# Patient Record
Sex: FEMALE | Race: BLACK OR AFRICAN AMERICAN | Employment: FULL TIME | URBAN - METROPOLITAN AREA
[De-identification: names, ages, dates, MRNs, and addresses within clinical notes are randomized per-mention and may not be internally consistent; named-entity substitution may affect disease eponyms.]

---

## 2017-01-23 DIAGNOSIS — N95.1 MENOPAUSAL SYMPTOM: Primary | ICD-10-CM

## 2017-01-24 NOTE — TELEPHONE ENCOUNTER
Patient requesting refill of estrogen supplement. Called and advised to make an appointment with me or her gynecologist to discuss plan regarding duration of therapy at this point. She agreed, would like refill x1 to allow for time to make this appointment.    Eduarad Ballard MD

## 2017-02-03 ENCOUNTER — TELEPHONE (OUTPATIENT)
Dept: SURGERY | Age: 62
End: 2017-02-03

## 2017-02-03 NOTE — TELEPHONE ENCOUNTER
The patient called requesting information on her visit for Monday and if she needed a cyst aspiration would it be done at the same time? I assured the patient depending on the findings, Dr. Faina Dejesus would discuss her options with her and it would ultimately be her decision if she wanted any procedures done. The patient was appreciative.

## 2017-02-06 ENCOUNTER — OFFICE VISIT (OUTPATIENT)
Dept: SURGERY | Age: 62
End: 2017-02-06

## 2017-02-06 VITALS
BODY MASS INDEX: 26.4 KG/M2 | DIASTOLIC BLOOD PRESSURE: 63 MMHG | HEIGHT: 63 IN | HEART RATE: 72 BPM | SYSTOLIC BLOOD PRESSURE: 131 MMHG | WEIGHT: 149 LBS

## 2017-02-06 DIAGNOSIS — N60.01 BREAST CYST, RIGHT: Primary | ICD-10-CM

## 2017-02-06 NOTE — COMMUNICATION BODY
HISTORY OF PRESENT ILLNESS  Will Avila is a 58 y.o. female. HPI  ESTABLISHED patient here for RIGHT breast lump. Patient was doing a self breast exam with in the last month and felt a lump to her outer RIGHT breast. She at times feels a quick shooting pain to the outer RIGHT breast. At times her nipples are tender. Denies rashes and skin or nipple changes.      History of biopsy in 2014 to the RIGHT breast which showed a fibroadenoma.     Mammogram and ultrasound 7/2016, BIRADS 2    Past Medical History   Diagnosis Date    Acne 6/14/2011    Allergic rhinitis 6/14/2011    Anemia 6/14/2011    Cancer ([facility] [address]      squamous cell, left ear    Degenerative Cervical Spondylosis w/ B Osteophyte Encroachment 12/19/2011    Migraines 6/14/2011    Recurrent Vaginal Candidiasis 12/19/2011    Right carpal tunnel syndrome 6/14/2011    Vasovagal syncope 6/14/2011    Vitamin D deficiency 6/14/2011       Past Surgical History   Procedure Laterality Date    Hx hemorrhoidectomy      Hx tubal ligation  12/96    Hx scar and bso  1/97     uterine fibroids, hrt Jeanette Neighbor    Pr repair  anal fistula,rect adv flap       external anal fistula repair with lateral sphincterotomy    Colonoscopy  7/2007     Normal, f/u 5 yrs; Dr Brennen Guerra Pr biopsy of skin lesion  1/2010     SCC of left EAC    Ekg orderable  [id number]     NSR, 1st degree AV Block, rates 60, 61, 61    Ekg orderable  2008     Sinus Bradycardia, rate 57, 1st degree AV block    Emg two extremities upper  1/2007, Dr Doyle Stapleton     mild median nerve neuropathy    Xr upper gi series w kub  8/2004     normal UGI and normal CXR    Hx tonsillectomy  age 16    Hx heent       eye surgery    Hx breast biopsy Right 6/5275, Dr Faina Dejesus     needle aspiration 2 benign cysts right breast; Fibroadenoma    Hx colonoscopy  9/2012, Dr Hang Calles     Normal, f/u 5 yrs d/t FHx       Social History     Social History    Marital status:      Spouse name: N/A    Number of children: 0    Years of education: N/A     Occupational History    Adm Asst, for [employer]      Unknown     Social History Main Topics    Smoking status: Never Smoker    Smokeless tobacco: Never Used    Alcohol use Yes      Comment: very occasional glass of wine ~ 1 x a month at most    Drug use: No    Sexual activity: Yes     Partners: Male     Birth control/ protection: None     Other Topics Concern    Caffeine Concern No     no coffee, 1 serving of diet green tea a day, no sodas    Weight Concern No     personal goal is in the 140's    Special Diet Yes     low cholesterol, pretty well balanced    Exercise Yes     4-5 x a week: walks on treadmill x 20 minutes, antoinette rider/stretches x 200 reps a day     Social History Narrative       Current Outpatient Prescriptions on File Prior to Visit   Medication Sig Dispense Refill    Estradiol (EVAMIST) 1.53 mg/spray (1.7%) spry 1 Spray by Apply Externally route daily. 3 Bottle 0    omeprazole (PRILOSEC) 40 mg capsule       trimethoprim-sulfamethoxazole (BACTRIM DS, SEPTRA DS) 160-800 mg per tablet Take 1 Tab by mouth two (2) times a day.  fluticasone (FLONASE) 50 mcg/actuation nasal spray 2 Sprays by Both Nostrils route daily. 3 Bottle 3    butalbital-acetaminophen-caffeine (FIORICET, ESGIC) -40 mg per tablet Take 1 Tab by mouth every four (4) hours as needed for Pain. Max Daily Amount: 6 Tabs. Indications: TENSION-TYPE HEADACHE 24 Tab 0    atorvastatin (LIPITOR) 20 mg tablet Take 1 Tab by mouth daily. 90 Tab 3    cholecalciferol, vitamin D3, (VITAMIN D3) 2,000 unit tab Take 5,000 Units by mouth. Takes a few x a week      MULTIVITS W-CA,FE,OTHER MIN (WOMEN'S DAILY MULTIVITAMIN PO) Take  by mouth. No current facility-administered medications on file prior to visit.         Allergies   Allergen Reactions    Codeine Other (comments)     Confusion, disoriented      Effexor [Venlafaxine] Other (comments)     Chest tightness, nausea       OB History      Para Term  AB TAB SAB Ectopic Multiple Living    0 0 0 0 0 0 0 0 0 0        Obstetric Comments    Previous Gyn Dr Mikki Loza   Menarche:  13. LMP: 38.  # of Children:  0. Age at Delivery of First Child:  n/a. Hysterectomy/oophorectomy:  YES/YES. Breast Bx:  no.  Hx of Breast Feeding:  no. BCP:  yes. Hormone therapy:  yes. ROS  Constitutional: Negative    HENT: Negative. Eyes: Negative. Respiratory: Negative. Cardiovascular: Negative. Gastrointestinal: Negative. Genitourinary: Negative. Musculoskeletal: Negative. Skin: Negative. Neurological: Negative. Endo/Heme/Allergies: Negative. Psychiatric/Behavioral: Negative. Physical Exam   Cardiovascular: Normal rate and normal heart sounds. Pulmonary/Chest: Breath sounds normal. Right breast exhibits mass (breast cyst). Right breast exhibits no inverted nipple, no nipple discharge, no skin change and no tenderness. Left breast exhibits no inverted nipple, no mass, no nipple discharge, no skin change and no tenderness. Breasts are symmetrical.       Lymphadenopathy:        Right cervical: No superficial cervical, no deep cervical and no posterior cervical adenopathy present. Left cervical: No superficial cervical, no deep cervical and no posterior cervical adenopathy present. Right axillary: No pectoral and no lateral adenopathy present. Left axillary: No pectoral and no lateral adenopathy present. BREAST ULTRASOUND  Indication: right breast mass 9:00  Technique: The area was scanned using a high-frequency linear-array near-field transducer  Findings: 2.0 cm mass, oval, anechoic, through transmission  Impression: Benign simple cyst  Disposition: Discussed aspiration or observation. Pt has elected for aspiration    Aspiration of Seroma/Hematoma  Indication : Seroma:  right 9 o'clock  Prep : We cleaned the skin with alcohol. Anesthesia :  We anesthetized with 1.5 cc Xylocaine. Guidance : We used Ultrasound for guidance. Aspiration : We advanced an 18 gauge needle into the fluid collection. Yield : We aspirated 4 cc of fluid. Effect : This decompressed the fluid collection and relieved discomfort    ASSESSMENT and PLAN    ICD-10-CM ICD-9-CM    1. Breast cyst, right N60.01 610.0      Pt presents with symptomatic RIGHT breast cyst, which we aspirated today per pt's request. Pt tolerated procedure well. F/u prn. This plan was reviewed with the patient and patient agrees. All questions were answered. Written by Tj Rosales, as dictated by Dr. Aj Calvillo MD.   [facility] is a 58 y.o. female. HPI  ESTABLISHED patient here for RIGHT breast lump. Patient was doing a self breast exam with in the last month and felt a lump to her outer RIGHT breast. She at times feels a quick shooting pain to the outer RIGHT breast. At times her nipples are tender. Denies rashes and skin or nipple changes.      History of biopsy in 2014 to the RIGHT breast which showed a fibroadenoma.     Mammogram and ultrasound 7/2016, BIRADS 2    Past Medical History   Diagnosis Date    Acne 6/14/2011    Allergic rhinitis 6/14/2011    Anemia 6/14/2011    Cancer ([facility] [address]      squamous cell, left ear    Degenerative Cervical Spondylosis w/ B Osteophyte Encroachment 12/19/2011    Migraines 6/14/2011    Recurrent Vaginal Candidiasis 12/19/2011    Right carpal tunnel syndrome 6/14/2011    Vasovagal syncope 6/14/2011    Vitamin D deficiency 6/14/2011       Past Surgical History   Procedure Laterality Date    Hx hemorrhoidectomy      Hx tubal ligation  12/96    Hx scar and bso  1/97     uterine fibroids, hrt Andrae Awan    Pr repair  anal fistula,rect adv flap       external anal fistula repair with lateral sphincterotomy    Colonoscopy  7/2007     Normal, f/u 5 yrs; Dr Umberto Ngo Pr biopsy of skin lesion  1/2010     SCC of left EAC    Ekg orderable  [id number]     NSR, 1st degree AV Block, rates 60, 61, 61    Ekg orderable  2008     Sinus Bradycardia, rate 57, 1st degree AV block    Emg two extremities upper  1/2007, Dr Kadie Alvarenga     mild median nerve neuropathy    Xr upper gi series w kub  8/2004     normal UGI and normal CXR    Hx tonsillectomy  age 16    Hx heent       eye surgery    Hx breast biopsy Right 8/1332, Dr Villa Aguilar     needle aspiration 2 benign cysts right breast; Fibroadenoma    Hx colonoscopy  9/2012, Dr Talya Goldberg     Normal, f/u 5 yrs d/t FHx       Social History     Social History    Marital status:      Spouse name: N/A    Number of children: 0    Years of education: N/A     Occupational History    Adm Asst, for [employer]      Unknown     Social History Main Topics    Smoking status: Never Smoker    Smokeless tobacco: Never Used    Alcohol use Yes      Comment: very occasional glass of wine ~ 1 x a month at most    Drug use: No    Sexual activity: Yes     Partners: Male     Birth control/ protection: None     Other Topics Concern    Caffeine Concern No     no coffee, 1 serving of diet green tea a day, no sodas    Weight Concern No     personal goal is in the 140's    Special Diet Yes     low cholesterol, pretty well balanced    Exercise Yes     4-5 x a week: walks on treadmill x 20 minutes, antoinette rider/stretches x 200 reps a day     Social History Narrative       Current Outpatient Prescriptions on File Prior to Visit   Medication Sig Dispense Refill    Estradiol (EVAMIST) 1.53 mg/spray (1.7%) spry 1 Spray by Apply Externally route daily. 3 Bottle 0    omeprazole (PRILOSEC) 40 mg capsule       trimethoprim-sulfamethoxazole (BACTRIM DS, SEPTRA DS) 160-800 mg per tablet Take 1 Tab by mouth two (2) times a day.  fluticasone (FLONASE) 50 mcg/actuation nasal spray 2 Sprays by Both Nostrils route daily.  3 Bottle 3    butalbital-acetaminophen-caffeine (FIORICET, ESGIC) -40 mg per tablet Take 1 Tab by mouth every four (4) hours as needed for Pain. Max Daily Amount: 6 Tabs. Indications: TENSION-TYPE HEADACHE 24 Tab 0    atorvastatin (LIPITOR) 20 mg tablet Take 1 Tab by mouth daily. 90 Tab 3    cholecalciferol, vitamin D3, (VITAMIN D3) 2,000 unit tab Take 5,000 Units by mouth. Takes a few x a week      MULTIVITS W-CA,FE,OTHER MIN (WOMEN'S DAILY MULTIVITAMIN PO) Take  by mouth. No current facility-administered medications on file prior to visit. Allergies   Allergen Reactions    Codeine Other (comments)     Confusion, disoriented      Effexor [Venlafaxine] Other (comments)     Chest tightness, nausea       OB History      Para Term  AB TAB SAB Ectopic Multiple Living    0 0 0 0 0 0 0 0 0 0        Obstetric Comments    Previous Gyn Dr Viviana Velazquezw Dr Lashonda Valencia   Menarche:  13. LMP: 38.  # of Children:  0. Age at Delivery of First Child:  n/a. Hysterectomy/oophorectomy:  YES/YES. Breast Bx:  no.  Hx of Breast Feeding:  no. BCP:  yes. Hormone therapy:  yes. ROS  Constitutional: Negative    HENT: Negative. Eyes: Negative. Respiratory: Negative. Cardiovascular: Negative. Gastrointestinal: Negative. Genitourinary: Negative. Musculoskeletal: Negative. Skin: Negative. Neurological: Negative. Endo/Heme/Allergies: Negative. Psychiatric/Behavioral: Negative. Physical Exam   Cardiovascular: Normal rate and normal heart sounds. Pulmonary/Chest: Breath sounds normal. Right breast exhibits mass (breast cyst). Right breast exhibits no inverted nipple, no nipple discharge, no skin change and no tenderness. Left breast exhibits no inverted nipple, no mass, no nipple discharge, no skin change and no tenderness. Breasts are symmetrical.       Lymphadenopathy:        Right cervical: No superficial cervical, no deep cervical and no posterior cervical adenopathy present.        Left cervical: No superficial cervical, no deep cervical and no posterior cervical adenopathy present. Right axillary: No pectoral and no lateral adenopathy present. Left axillary: No pectoral and no lateral adenopathy present. BREAST ULTRASOUND  Indication: right breast mass 9:00  Technique: The area was scanned using a high-frequency linear-array near-field transducer  Findings: 2.0 cm mass, oval, anechoic, through transmission  Impression: Benign simple cyst  Disposition: Discussed aspiration or observation. Pt has elected for aspiration    Aspiration of Seroma/Hematoma  Indication : Seroma:  right 9 o'clock  Prep : We cleaned the skin with alcohol. Anesthesia : We anesthetized with 1.5 cc Xylocaine. Guidance : We used Ultrasound for guidance. Aspiration : We advanced an 18 gauge needle into the fluid collection. Yield : We aspirated 4 cc of fluid. Effect : This decompressed the fluid collection and relieved discomfort    ASSESSMENT and PLAN    ICD-10-CM ICD-9-CM    1. Breast cyst, right N60.01 610.0      Pt presents with symptomatic RIGHT breast cyst, which we aspirated today per pt's request. Pt tolerated procedure well. F/u prn. This plan was reviewed with the patient and patient agrees. All questions were answered.     Written by Ellen Johnson, as dictated by Dr. Nickolas Pena MD.

## 2017-02-06 NOTE — PATIENT INSTRUCTIONS
Breast Lumps: Care Instructions  Your Care Instructions  Breast lumps are common, especially in women between ages 27 and 48. Many womens breasts feel lumpy and tender before their menstrual period. Women also may have lumps when they are breastfeeding. Breast lumps may go away after menopause. All new breast lumps in women after menopause should be checked by a doctor. Although lumps may be normal for you, it is important to have your doctor check any lump or thickness that is not like the rest of your breast to make sure it is not cancer. A lump may be larger, harder, or different from the rest of your breast tissue. Follow-up care is a key part of your treatment and safety. Be sure to make and go to all appointments, and call your doctor if you are having problems. Its also a good idea to know your test results and keep a list of the medicines you take. How can you care for yourself at home? · Make an appointment to have a mammogram and other follow-up visits as recommended by your doctor. When should you call for help? Call your doctor now or seek immediate medical care if:  · You have new changes in a breast, such as:  ¨ A lump or thickening in your breast or armpit. ¨ A change in the breast's size or shape. ¨ Skin changes, such as dimples or puckers. ¨ Nipple discharge. ¨ A change in the color or feel of the skin of your breast or the darker area around the nipple (areola). ¨ A change in the shape of the nipple (it may look like it's being pulled into the breast). · You have symptoms of a breast infection, such as:  ¨ Increased pain, swelling, redness, or warmth around a breast.  ¨ Red streaks extending from the breast.  ¨ Pus draining from a breast.  ¨ A fever. Watch closely for changes in your health, and be sure to contact your doctor if:  · You do not get better as expected. Where can you learn more? Go to http://telma-wendy.info/.   Enter Z119 in the search box to learn more about \"Breast Lumps: Care Instructions. \"  Current as of: February 25, 2016  Content Version: 11.1  © 9563-7720 [employer]. Care instructions adapted under license by [employer] (which disclaims liability or warranty for this information). If you have questions about a medical condition or this instruction, always ask your healthcare professional. [address] any warranty or liability for your use of this information. Breast Lumps: Care Instructions  Your Care Instructions  Breast lumps are common, especially in women between ages 27 and 48. Many womens breasts feel lumpy and tender before their menstrual period. Women also may have lumps when they are breastfeeding. Breast lumps may go away after menopause. All new breast lumps in women after menopause should be checked by a doctor. Although lumps may be normal for you, it is important to have your doctor check any lump or thickness that is not like the rest of your breast to make sure it is not cancer. A lump may be larger, harder, or different from the rest of your breast tissue. Follow-up care is a key part of your treatment and safety. Be sure to make and go to all appointments, and call your doctor if you are having problems. Its also a good idea to know your test results and keep a list of the medicines you take. How can you care for yourself at home? · Make an appointment to have a mammogram and other follow-up visits as recommended by your doctor. When should you call for help? Call your doctor now or seek immediate medical care if:  · You have new changes in a breast, such as:  ¨ A lump or thickening in your breast or armpit. ¨ A change in the breast's size or shape. ¨ Skin changes, such as dimples or puckers. ¨ Nipple discharge. ¨ A change in the color or feel of the skin of your breast or the darker area around the nipple (areola).   ¨ A change in the shape of the nipple (it may look like it's being pulled into the breast). · You have symptoms of a breast infection, such as:  ¨ Increased pain, swelling, redness, or warmth around a breast.  ¨ Red streaks extending from the breast.  ¨ Pus draining from a breast.  ¨ A fever. Watch closely for changes in your health, and be sure to contact your doctor if:  · You do not get better as expected. Where can you learn more? Go to http://telma-wendy.info/. Enter T353 in the search box to learn more about \"Breast Lumps: Care Instructions. \"  Current as of: February 25, 2016  Content Version: 11.1  © 7334-2930 [employer]. Care instructions adapted under license by [employer] (which disclaims liability or warranty for this information). If you have questions about a medical condition or this instruction, always ask your healthcare professional. [address] any warranty or liability for your use of this information.

## 2017-02-06 NOTE — PROGRESS NOTES
HISTORY OF PRESENT ILLNESS  Martha Waggoner is a 58 y.o. female. HPI ESTABLISHED patient here for RIGHT breast lump. Patient was doing a self breast exam with in the last month and felt a lump to her outer RIGHT breast.  She at times feels a quick shooting pain to the outer RIGHT breast.  At times her nipples are tender. Denies rashes and skin or nipple changes. History of biopsy in 2014 to the RIGHT breast which showed a fibroadenoma.     Mammogram and ultrasound 7/2016, BIRADS 2    ROS    Physical Exam    ASSESSMENT and PLAN  {ASSESSMENT/PLAN:[id number]}

## 2017-02-06 NOTE — PROGRESS NOTES
HISTORY OF PRESENT ILLNESS  Hussein Piña is a 58 y.o. female. HPI  ESTABLISHED patient here for RIGHT breast lump. Patient was doing a self breast exam with in the last month and felt a lump to her outer RIGHT breast. She at times feels a quick shooting pain to the outer RIGHT breast. At times her nipples are tender. Denies rashes and skin or nipple changes.      History of biopsy in 2014 to the RIGHT breast which showed a fibroadenoma.     Mammogram and ultrasound 7/2016, BIRADS 2    Past Medical History   Diagnosis Date    Acne 6/14/2011    Allergic rhinitis 6/14/2011    Anemia 6/14/2011    Cancer ([facility] [address]      squamous cell, left ear    Degenerative Cervical Spondylosis w/ B Osteophyte Encroachment 12/19/2011    Migraines 6/14/2011    Recurrent Vaginal Candidiasis 12/19/2011    Right carpal tunnel syndrome 6/14/2011    Vasovagal syncope 6/14/2011    Vitamin D deficiency 6/14/2011       Past Surgical History   Procedure Laterality Date    Hx hemorrhoidectomy      Hx tubal ligation  12/96    Hx scar and bso  1/97     uterine fibroids, hrt Genita Gourd    Pr repair  anal fistula,rect adv flap       external anal fistula repair with lateral sphincterotomy    Colonoscopy  7/2007     Normal, f/u 5 yrs; Dr Sadie Brothers Pr biopsy of skin lesion  1/2010     SCC of left EAC    Ekg orderable  [id number]     NSR, 1st degree AV Block, rates 60, 61, 61    Ekg orderable  2008     Sinus Bradycardia, rate 57, 1st degree AV block    Emg two extremities upper  1/2007, Dr Candy Tang     mild median nerve neuropathy    Xr upper gi series w kub  8/2004     normal UGI and normal CXR    Hx tonsillectomy  age 16    Hx heent       eye surgery    Hx breast biopsy Right 5/9857, Dr Belle Bolanos     needle aspiration 2 benign cysts right breast; Fibroadenoma    Hx colonoscopy  9/2012, Dr Sadiq Paz     Normal, f/u 5 yrs d/t FHx       Social History     Social History    Marital status:      Spouse name: N/A    Number of children: 0    Years of education: N/A     Occupational History    Adm Asst, for [employer]      Unknown     Social History Main Topics    Smoking status: Never Smoker    Smokeless tobacco: Never Used    Alcohol use Yes      Comment: very occasional glass of wine ~ 1 x a month at most    Drug use: No    Sexual activity: Yes     Partners: Male     Birth control/ protection: None     Other Topics Concern    Caffeine Concern No     no coffee, 1 serving of diet green tea a day, no sodas    Weight Concern No     personal goal is in the 140's    Special Diet Yes     low cholesterol, pretty well balanced    Exercise Yes     4-5 x a week: walks on treadmill x 20 minutes, antoinette rider/stretches x 200 reps a day     Social History Narrative       Current Outpatient Prescriptions on File Prior to Visit   Medication Sig Dispense Refill    Estradiol (EVAMIST) 1.53 mg/spray (1.7%) spry 1 Spray by Apply Externally route daily. 3 Bottle 0    omeprazole (PRILOSEC) 40 mg capsule       trimethoprim-sulfamethoxazole (BACTRIM DS, SEPTRA DS) 160-800 mg per tablet Take 1 Tab by mouth two (2) times a day.  fluticasone (FLONASE) 50 mcg/actuation nasal spray 2 Sprays by Both Nostrils route daily. 3 Bottle 3    butalbital-acetaminophen-caffeine (FIORICET, ESGIC) -40 mg per tablet Take 1 Tab by mouth every four (4) hours as needed for Pain. Max Daily Amount: 6 Tabs. Indications: TENSION-TYPE HEADACHE 24 Tab 0    atorvastatin (LIPITOR) 20 mg tablet Take 1 Tab by mouth daily. 90 Tab 3    cholecalciferol, vitamin D3, (VITAMIN D3) 2,000 unit tab Take 5,000 Units by mouth. Takes a few x a week      MULTIVITS W-CA,FE,OTHER MIN (WOMEN'S DAILY MULTIVITAMIN PO) Take  by mouth. No current facility-administered medications on file prior to visit.         Allergies   Allergen Reactions    Codeine Other (comments)     Confusion, disoriented      Effexor [Venlafaxine] Other (comments)     Chest tightness, nausea       OB History      Para Term  AB TAB SAB Ectopic Multiple Living    0 0 0 0 0 0 0 0 0 0        Obstetric Comments    Previous Gyn Dr Berto Huitron   Menarche:  13. LMP: 38.  # of Children:  0. Age at Delivery of First Child:  n/a. Hysterectomy/oophorectomy:  YES/YES. Breast Bx:  no.  Hx of Breast Feeding:  no. BCP:  yes. Hormone therapy:  yes. ROS  Constitutional: Negative    HENT: Negative. Eyes: Negative. Respiratory: Negative. Cardiovascular: Negative. Gastrointestinal: Negative. Genitourinary: Negative. Musculoskeletal: Negative. Skin: Negative. Neurological: Negative. Endo/Heme/Allergies: Negative. Psychiatric/Behavioral: Negative. Physical Exam   Cardiovascular: Normal rate and normal heart sounds. Pulmonary/Chest: Breath sounds normal. Right breast exhibits mass (breast cyst). Right breast exhibits no inverted nipple, no nipple discharge, no skin change and no tenderness. Left breast exhibits no inverted nipple, no mass, no nipple discharge, no skin change and no tenderness. Breasts are symmetrical.       Lymphadenopathy:        Right cervical: No superficial cervical, no deep cervical and no posterior cervical adenopathy present. Left cervical: No superficial cervical, no deep cervical and no posterior cervical adenopathy present. Right axillary: No pectoral and no lateral adenopathy present. Left axillary: No pectoral and no lateral adenopathy present. BREAST ULTRASOUND  Indication: right breast mass 9:00  Technique: The area was scanned using a high-frequency linear-array near-field transducer  Findings: 2.0 cm mass, oval, anechoic, through transmission  Impression: Benign simple cyst  Disposition: Discussed aspiration or observation. Pt has elected for aspiration    Aspiration of Seroma/Hematoma  Indication : Seroma:  right 9 o'clock  Prep : We cleaned the skin with alcohol. Anesthesia :  We anesthetized with 1.5 cc Xylocaine. Guidance : We used Ultrasound for guidance. Aspiration : We advanced an 18 gauge needle into the fluid collection. Yield : We aspirated 4 cc of fluid. Effect : This decompressed the fluid collection and relieved discomfort    ASSESSMENT and PLAN    ICD-10-CM ICD-9-CM    1. Breast cyst, right N60.01 610.0      Pt presents with symptomatic RIGHT breast cyst, which we aspirated today per pt's request. Pt tolerated procedure well. F/u prn. This plan was reviewed with the patient and patient agrees. All questions were answered.     Written by Tiana Shepard, as dictated by Dr. Fermín Chawla MD.

## 2017-02-06 NOTE — MR AVS SNAPSHOT
Visit Information Date & Time Provider Department Dept. Phone Encounter #  
 2/6/2017  7:87 AM Samson Beyer MD [facility] [phone] [id number] Follow-up Instructions Return if symptoms worsen or fail to improve. Follow-up and Disposition History Your Appointments 5/23/2017  8:15 AM  
ROUTINE CARE with Rebecca Umana MD  
[facility] Appt Note: F/u for migraine [address] [facility] [address]  
[address] [facility] [address] Upcoming Health Maintenance Date Due Hepatitis C Screening 1955 BREAST CANCER SCRN MAMMOGRAM 7/8/2018 PAP AKA CERVICAL CYTOLOGY 12/17/2018 COLONOSCOPY 9/7/2022 DTaP/Tdap/Td series (2 - Td) 10/3/2023 Allergies as of 2/6/2017  Review Complete On: 4/5/5472 By: Samson Beyer MD  
  
 Severity Noted Reaction Type Reactions Codeine  03/31/2010    Other (comments) Confusion, disoriented Effexor [Venlafaxine]  06/14/2011    Other (comments) Chest tightness, nausea Current Immunizations  Reviewed on 11/4/2014 Name Date Influenza Vaccine 10/1/2016, 10/30/2015, 10/1/2014 PPD 5/1/2009 TD Vaccine 8/13/2004 Tdap 10/3/2013 Zoster Vaccine, Live 10/27/2014 Not reviewed this visit You Were Diagnosed With   
  
 Codes Comments Breast cyst, right    -  Primary ICD-10-CM: N60.01 
ICD-9-CM: 610.0 Vitals BP Pulse Height(growth percentile) Weight(growth percentile) BMI OB Status 131/63 72 5' 2.5\" (1.588 m) 149 lb (67.6 kg) 26.82 kg/m2 Hysterectomy Smoking Status Never Smoker Vitals History BMI and BSA Data Body Mass Index Body Surface Area  
 26.82 kg/m 2 1.73 m 2 [phone] Your Updated Medication List  
  
   
This list is accurate as of: 2/6/17  4:17 PM.  Always use your most recent med list.  
  
  
  
  
 atorvastatin 20 mg tablet Commonly known as:  LIPITOR Take 1 Tab by mouth daily. butalbital-acetaminophen-caffeine -40 mg per tablet Commonly known as:  Eyal [place] Take 1 Tab by mouth every four (4) hours as needed for Pain. Max Daily Amount: 6 Tabs. Indications: TENSION-TYPE HEADACHE Estradiol 1.53 mg/spray (1.7%) Spry Commonly known as:  EVAMIST  
1 Spray by Apply Externally route daily. fluticasone 50 mcg/actuation nasal spray Commonly known as:  Andres Rayne 2 Sprays by Both Nostrils route daily. omeprazole 40 mg capsule Commonly known as:  PRILOSEC  
  
 trimethoprim-sulfamethoxazole 160-800 mg per tablet Commonly known as:  BACTRIM DS, SEPTRA DS Take 1 Tab by mouth two (2) times a day. VITAMIN D3 2,000 unit Tab Generic drug:  cholecalciferol (vitamin D3) Take 5,000 Units by mouth. Takes a few x a week WOMEN'S DAILY MULTIVITAMIN PO Take  by mouth. Follow-up Instructions Return if symptoms worsen or fail to improve. Patient Instructions Breast Lumps: Care Instructions Your Care Instructions Breast lumps are common, especially in women between ages 27 and 48. Many womens breasts feel lumpy and tender before their menstrual period. Women also may have lumps when they are breastfeeding. Breast lumps may go away after menopause. All new breast lumps in women after menopause should be checked by a doctor. Although lumps may be normal for you, it is important to have your doctor check any lump or thickness that is not like the rest of your breast to make sure it is not cancer. A lump may be larger, harder, or different from the rest of your breast tissue. Follow-up care is a key part of your treatment and safety. Be sure to make and go to all appointments, and call your doctor if you are having problems. Its also a good idea to know your test results and keep a list of the medicines you take. How can you care for yourself at home? · Make an appointment to have a mammogram and other follow-up visits as recommended by your doctor. When should you call for help? Call your doctor now or seek immediate medical care if: 
· You have new changes in a breast, such as: ¨ A lump or thickening in your breast or armpit. ¨ A change in the breast's size or shape. ¨ Skin changes, such as dimples or puckers. ¨ Nipple discharge. ¨ A change in the color or feel of the skin of your breast or the darker area around the nipple (areola). ¨ A change in the shape of the nipple (it may look like it's being pulled into the breast). · You have symptoms of a breast infection, such as: 
¨ Increased pain, swelling, redness, or warmth around a breast. 
¨ Red streaks extending from the breast. 
¨ Pus draining from a breast. 
¨ A fever. Watch closely for changes in your health, and be sure to contact your doctor if: 
· You do not get better as expected. Where can you learn more? Go to http://telma-wendy.info/. Enter N830 in the search box to learn more about \"Breast Lumps: Care Instructions. \" Current as of: February 25, 2016 Content Version: 11.1 © 7402-2468 [employer]. Care instructions adapted under license by [employer] (which disclaims liability or warranty for this information). If you have questions about a medical condition or this instruction, always ask your healthcare professional. [place] [address] any warranty or liability for your use of this information. Breast Lumps: Care Instructions Your Care Instructions Breast lumps are common, especially in women between ages 27 and 48.  Many womens breasts feel lumpy and tender before their menstrual period. Women also may have lumps when they are breastfeeding. Breast lumps may go away after menopause. All new breast lumps in women after menopause should be checked by a doctor. Although lumps may be normal for you, it is important to have your doctor check any lump or thickness that is not like the rest of your breast to make sure it is not cancer. A lump may be larger, harder, or different from the rest of your breast tissue. Follow-up care is a key part of your treatment and safety. Be sure to make and go to all appointments, and call your doctor if you are having problems. Its also a good idea to know your test results and keep a list of the medicines you take. How can you care for yourself at home? · Make an appointment to have a mammogram and other follow-up visits as recommended by your doctor. When should you call for help? Call your doctor now or seek immediate medical care if: 
· You have new changes in a breast, such as: ¨ A lump or thickening in your breast or armpit. ¨ A change in the breast's size or shape. ¨ Skin changes, such as dimples or puckers. ¨ Nipple discharge. ¨ A change in the color or feel of the skin of your breast or the darker area around the nipple (areola). ¨ A change in the shape of the nipple (it may look like it's being pulled into the breast). · You have symptoms of a breast infection, such as: 
¨ Increased pain, swelling, redness, or warmth around a breast. 
¨ Red streaks extending from the breast. 
¨ Pus draining from a breast. 
¨ A fever. Watch closely for changes in your health, and be sure to contact your doctor if: 
· You do not get better as expected. Where can you learn more? Go to http://telma-wendy.info/. Enter Q677 in the search box to learn more about \"Breast Lumps: Care Instructions. \" Current as of: February 25, 2016 Content Version: 11.1 © 1592-3481 [employer]. Care instructions adapted under license by [employer] (which disclaims liability or warranty for this information). If you have questions about a medical condition or this instruction, always ask your healthcare professional. [address] any warranty or liability for your use of this information. Introducing [facility]! Dear Luisa Levy: Thank you for requesting a Jagex account. Our records indicate that you already have an active Jagex account. You can access your account anytime at [email or web address] Did you know that you can access your hospital and ER discharge instructions at any time in Jagex? You can also review all of your test results from your hospital stay or ER visit. Additional Information If you have questions, please visit the Frequently Asked Questions section of the Jagex website at [email or web address] Remember, Jagex is NOT to be used for urgent needs. For medical emergencies, dial 911. Now available from your iPhone and Android! Please provide this summary of care documentation to your next provider. Your primary care clinician is listed as Olayinka Howard. If you have any questions after today's visit, please call [phone].

## 2017-06-13 ENCOUNTER — OFFICE VISIT (OUTPATIENT)
Dept: INTERNAL MEDICINE CLINIC | Age: 62
End: 2017-06-13

## 2017-06-13 VITALS
WEIGHT: 152.6 LBS | HEIGHT: 63 IN | TEMPERATURE: 97.9 F | OXYGEN SATURATION: 99 % | RESPIRATION RATE: 16 BRPM | HEART RATE: 70 BPM | DIASTOLIC BLOOD PRESSURE: 54 MMHG | SYSTOLIC BLOOD PRESSURE: 120 MMHG | BODY MASS INDEX: 27.04 KG/M2

## 2017-06-13 DIAGNOSIS — M79.89 SWELLING OF LOWER EXTREMITY: ICD-10-CM

## 2017-06-13 DIAGNOSIS — K21.9 GASTROESOPHAGEAL REFLUX DISEASE WITHOUT ESOPHAGITIS: ICD-10-CM

## 2017-06-13 DIAGNOSIS — J30.1 SEASONAL ALLERGIC RHINITIS DUE TO POLLEN: ICD-10-CM

## 2017-06-13 DIAGNOSIS — E55.9 VITAMIN D DEFICIENCY: ICD-10-CM

## 2017-06-13 DIAGNOSIS — D50.9 IRON DEFICIENCY ANEMIA, UNSPECIFIED IRON DEFICIENCY ANEMIA TYPE: ICD-10-CM

## 2017-06-13 DIAGNOSIS — G43.009 NONINTRACTABLE MIGRAINE, UNSPECIFIED MIGRAINE TYPE: ICD-10-CM

## 2017-06-13 DIAGNOSIS — E78.5 HYPERLIPIDEMIA, UNSPECIFIED HYPERLIPIDEMIA TYPE: Primary | ICD-10-CM

## 2017-06-13 RX ORDER — CETIRIZINE HCL 10 MG
10 TABLET ORAL
Qty: 30 TAB | Refills: 4 | Status: SHIPPED | OUTPATIENT
Start: 2017-06-13 | End: 2017-06-26 | Stop reason: CLARIF

## 2017-06-13 RX ORDER — OMEPRAZOLE 20 MG/1
20 CAPSULE, DELAYED RELEASE ORAL
COMMUNITY
Start: 2017-06-13

## 2017-06-13 NOTE — PROGRESS NOTES
NÉSTOR   Panchito Gonzales is a 58 y.o. female, she presents today for:     Notes some swelling in ankles and feet, increased in last month. Has some history of mild swelling. Has chronic fatigue, but this is not a change. No change in digestion. Taking acid reflux medication as needed     Has been taking bactrim off and on. Follows with Dr. Romina Foster for breast cysts. Reminded patient of need for follow-up mammogram in next few months. Migraine headache: notes that she has had several. Light sensitivity (+). Goes in spurts, for past month or 2 has had headache at least 2 times per week. Has had a lot of business at work, getting normal sleep, not feeling stressed. If she has a headache she eats something and sometimes it goes away after that. May take a tylneol or advil if it continues. (sudafed also seems to help). Does not have aura. PMH/PSH: reviewed and updated  Sochx:  reports that she has never smoked. She has never used smokeless tobacco. She reports that she drinks alcohol. She reports that she does not use illicit drugs. Famhx: reviewed and updated     All: Allergies   Allergen Reactions    Codeine Other (comments)     Confusion, disoriented      Effexor [Venlafaxine] Other (comments)     Chest tightness, nausea     Med:   Current Outpatient Prescriptions   Medication Sig    Estradiol (EVAMIST) 1.53 mg/spray (1.7%) spry 1 Spray by Apply Externally route daily.  trimethoprim-sulfamethoxazole (BACTRIM DS, SEPTRA DS) 160-800 mg per tablet Take 1 Tab by mouth two (2) times a day.  fluticasone (FLONASE) 50 mcg/actuation nasal spray 2 Sprays by Both Nostrils route daily.  butalbital-acetaminophen-caffeine (FIORICET, ESGIC) -40 mg per tablet Take 1 Tab by mouth every four (4) hours as needed for Pain. Max Daily Amount: 6 Tabs. Indications: TENSION-TYPE HEADACHE    atorvastatin (LIPITOR) 20 mg tablet Take 1 Tab by mouth daily.     cholecalciferol, vitamin D3, (VITAMIN D3) 2,000 unit tab Take 5,000 Units by mouth. Takes a few x a week    MULTIVITS W-CA,FE,OTHER MIN (WOMEN'S DAILY MULTIVITAMIN PO) Take  by mouth. No current facility-administered medications for this visit. Review of Systems   Constitutional: Negative for chills, fever and malaise/fatigue. Respiratory: Negative for shortness of breath. Cardiovascular: Negative for chest pain. PE:  Blood pressure 120/54, pulse 70, temperature 97.9 °F (36.6 °C), temperature source Oral, resp. rate 16, height 5' 2.5\" (1.588 m), weight 152 lb 9.6 oz (69.2 kg), SpO2 99 %. Body mass index is 27.47 kg/(m^2). Physical Exam   Constitutional: She is oriented to person, place, and time. She appears well-developed and well-nourished. No distress. HENT:   Head: Normocephalic. Mouth/Throat: Oropharynx is clear and moist.   Eyes: Conjunctivae and EOM are normal.   Neck: Neck supple. No JVD present. Cardiovascular: Normal rate, regular rhythm and normal heart sounds. No murmur heard. Pulmonary/Chest: Effort normal and breath sounds normal.   Abdominal: Soft. Musculoskeletal:   Increased fullness anterior to lateral malleolus bilaterally. No edema, no pitting. Neurological: She is alert and oriented to person, place, and time. Skin: Skin is warm and dry. Nursing note and vitals reviewed. Labs:   No results found for any visits on 06/13/17. A/P:  58 y.o. female    ICD-10-CM ICD-9-CM    1. Hyperlipidemia, unspecified hyperlipidemia type E78.5 272.4    2. Vitamin D deficiency E55.9 268.9    3. Nonintractable migraine, unspecified migraine type G43.009 346.10 cetirizine (ZYRTEC) 10 mg tablet   4. Iron deficiency anemia, unspecified iron deficiency anemia type D50.9 280.9 CBC W/O DIFF   5. Gastroesophageal reflux disease without esophagitis K21.9 530.81 omeprazole (PRILOSEC) 20 mg capsule   6.  Swelling of lower extremity U47.11 034.67 METABOLIC PANEL, COMPREHENSIVE   7. Seasonal allergic rhinitis due to pollen J30.1 477.0 cetirizine (ZYRTEC) 10 mg tablet     Chronic headache:  Overall well controlled, with recent increase in same time frame as increased congestion, treat seasonal allergies. No new changes in medication. History of iron deficiency anemia: CBC ordered. GERD: to reduce from 40 to 20mg dosage. Initially started on ppi with ENT. Concern for swelling of lower extremity: normal cardiac exam and history. suspect bursa fluid collection. Encouraged increased activity at work and low salt diet. Follow-up Disposition:  Return in about 6 months (around 12/13/2017) for well woman visit, please return earlier if headaches are not imrpoved. Chris Mccarty

## 2017-06-13 NOTE — PROGRESS NOTES
RM 3  Per pt , she is been having some swelling in her ankles and in her feet more lately . Chief Complaint   Patient presents with    Headache     follow up       1. Have you been to the ER, urgent care clinic since your last visit? Hospitalized since your last visit? No    2. Have you seen or consulted any other health care providers outside of the [address] [place] [address] since your last visit? Include any pap smears or colon screening.  No    Health Maintenance Due   Topic Date Due    Hepatitis C Screening  1955     Living will sent to pt AVS

## 2017-06-13 NOTE — MR AVS SNAPSHOT
Visit Information Date & Time Provider Department Dept. Phone Encounter #  
 6/13/2017  8:30 AM Belle Goldberg, MD [facility] [phone] [id number] Follow-up Instructions Return in about 6 months (around 12/13/2017) for well woman visit, please return earlier if headaches are not imrpoved. Yaima Abbott Upcoming Health Maintenance Date Due Hepatitis C Screening 1955 INFLUENZA AGE 9 TO ADULT 8/1/2017 BREAST CANCER SCRN MAMMOGRAM 7/8/2018 PAP AKA CERVICAL CYTOLOGY 12/17/2018 COLONOSCOPY 9/7/2022 DTaP/Tdap/Td series (2 - Td) 10/3/2023 Allergies as of 6/13/2017  Review Complete On: 6/13/2017 By: Luis Miguel Awad LPN Severity Noted Reaction Type Reactions Codeine  03/31/2010    Other (comments) Confusion, disoriented Effexor [Venlafaxine]  06/14/2011    Other (comments) Chest tightness, nausea Current Immunizations  Reviewed on 11/4/2014 Name Date Influenza Vaccine 10/1/2016, 10/30/2015, 10/1/2014 PPD 5/1/2009 TD Vaccine 8/13/2004 Tdap 10/3/2013 Zoster Vaccine, Live 10/27/2014 Not reviewed this visit You Were Diagnosed With   
  
 Codes Comments Hyperlipidemia, unspecified hyperlipidemia type    -  Primary ICD-10-CM: E78.5 ICD-9-CM: 272.4 Vitamin D deficiency     ICD-10-CM: E55.9 ICD-9-CM: 268.9 Nonintractable migraine, unspecified migraine type     ICD-10-CM: G43.009 ICD-9-CM: 346.10 Iron deficiency anemia, unspecified iron deficiency anemia type     ICD-10-CM: D50.9 ICD-9-CM: 280.9 Gastroesophageal reflux disease without esophagitis     ICD-10-CM: K21.9 ICD-9-CM: 530.81 Swelling of lower extremity     ICD-10-CM: M79.89 ICD-9-CM: 729.81 Seasonal allergic rhinitis due to pollen     ICD-10-CM: J30.1 ICD-9-CM: 477.0 Vitals BP Pulse Temp Resp Height(growth percentile) Weight(growth percentile) 120/54 (BP 1 Location: Left arm, BP Patient Position: Sitting) 70 97.9 °F (36.6 °C) (Oral) 16 5' 2.5\" (1.588 m) 152 lb 9.6 oz (69.2 kg) SpO2 BMI OB Status Smoking Status 99% 27.47 kg/m2 Hysterectomy Never Smoker BMI and BSA Data Body Mass Index Body Surface Area  
 27.47 kg/m 2 1.75 m 2 Preferred Pharmacy Pharmacy Name Phone [address] [facility] [phone] Your Updated Medication List  
  
   
This list is accurate as of: 6/13/17  9:48 AM.  Always use your most recent med list.  
  
  
  
  
 atorvastatin 20 mg tablet Commonly known as:  LIPITOR Take 1 Tab by mouth daily. butalbital-acetaminophen-caffeine -40 mg per tablet Commonly known as:  Bettyann Silvius Take 1 Tab by mouth every four (4) hours as needed for Pain. Max Daily Amount: 6 Tabs. Indications: TENSION-TYPE HEADACHE  
  
 cetirizine 10 mg tablet Commonly known as:  ZYRTEC Take 1 Tab by mouth daily as needed for Allergies. Estradiol 1.53 mg/spray (1.7%) Spry Commonly known as:  EVAMIST  
1 Spray by Apply Externally route daily. fluticasone 50 mcg/actuation nasal spray Commonly known as:  Dolly Rad 2 Sprays by Both Nostrils route daily. omeprazole 20 mg capsule Commonly known as:  PRILOSEC Take 1 Cap by mouth daily. trimethoprim-sulfamethoxazole 160-800 mg per tablet Commonly known as:  BACTRIM DS, SEPTRA DS Take 1 Tab by mouth two (2) times a day. VITAMIN D3 2,000 unit Tab Generic drug:  cholecalciferol (vitamin D3) Take 5,000 Units by mouth. Takes a few x a week WOMEN'S DAILY MULTIVITAMIN PO Take  by mouth. Prescriptions Sent to Pharmacy Refills  
 cetirizine (ZYRTEC) 10 mg tablet 4 Sig: Take 1 Tab by mouth daily as needed for Allergies. Class: Normal  
 Pharmacy: [address], [address] [place] [address] #: [phone] Route: Oral  
  
We Performed the Following CBC W/O DIFF [98483 CPT(R)] METABOLIC PANEL, COMPREHENSIVE [62034 CPT(R)] Follow-up Instructions Return in about 6 months (around 12/13/2017) for well woman visit, please return earlier if headaches are not imrpoved. .  
  
  
Patient Instructions Please discuss stopping trimethropim-sulfamethoxazole with dermatologist.  
 
Continue flonase for allergies, add in antihistamine for this season to help reduce headaches. For leg swelling. Monitor salt and avoid salty foods. Try and take an extra walk/stretch at least 1 time a work, if possible do this 2 times. Consider adding compression stockings. How to Read a Food Label to Limit Sodium: Care Instructions Your Care Instructions Sodium causes your body to hold on to extra water. This can raise your blood pressure and force your heart and kidneys to work harder. In very serious cases, this could cause you to be put in the hospital. It might even be life-threatening. By limiting sodium, you will feel better and lower your risk of serious problems. Processed foods, fast food, and restaurant foods are the major sources of dietary sodium. The most common name for sodium is salt. Try to limit how much sodium you eat to less than 2,300 milligrams (mg) a day. If you limit your sodium to 1,500 mg a day, you can lower your blood pressure even more. This limit counts all the salt that you eat in foods you cook or in packaged foods. Keep a list of everything you eat and drink. Follow-up care is a key part of your treatment and safety. Be sure to make and go to all appointments, and call your doctor if you are having problems. It's also a good idea to know your test results and keep a list of the medicines you take. How can you care for yourself at home? Read ingredient lists on food labels · Read the list of ingredients on food labels to help you find how much sodium is in a food. The label lists the ingredients in a food in descending order (from the most to the least). If salt or sodium is high on the list, there may be a lot of sodium in the food. · Know that sodium has different names. Sodium is also called monosodium glutamate (MSG, common in [place] food), sodium citrate, sodium alginate, sodium hydroxide, and sodium phosphate. Read Nutrition Facts labels · On most foods, there is a Nutrition Facts label. This will tell you how much sodium is in one serving of food. Look at both the serving size and the sodium amount. The serving size is located at the top of the label, usually right under the \"Nutrition Facts\" title. The amount of sodium is given in the list under the title. It is given in milligrams (mg). ¨ Check the serving size carefully. A single serving is often very small, and you may eat more than one serving. If this is the case, you will eat more sodium than listed on the label. For example, if the serving size for a canned soup is 1 cup and the sodium amount is 470 mg, if you have 2 cups you will eat 940 mg of sodium. · The nutrition facts for fresh fruits and vegetables are not listed on the food. They may be listed somewhere in the store. These foods usually have no sodium or low sodium. · The Nutrition Facts label also gives you the Percent Daily Value for sodium. This is how much of the recommended amount of sodium a serving contains. The daily value for sodium is less than 2,300 mg. So if the Percent Daily Value says 50%, this means one serving is giving you half of this, or 1,150 mg. Buy low-sodium foods · Look for foods that are made with less sodium. Watch for the following words on the label. ¨ \"Unsalted\" means there is no sodium added to the food. But there may be sodium already in the food naturally. ¨ \"Sodium-free\" means a serving has less than 5 mg of sodium. ¨ \"Very low sodium\" means a serving has 35 mg or less of sodium. ¨ \"Low-sodium\" means a serving has 140 mg or less of sodium. · \"Reduced-sodium\" means that there is 25% less sodium than what the food normally has. This is still usually too much sodium. Try not to buy foods with this on the label. · Buy fresh vegetables, or frozen vegetables without added sauces. Buy low-sodium versions of canned vegetables, soups, and other canned goods. Where can you learn more? Go to http://telma-wendy.info/. Enter 26 [id number] in the search box to learn more about \"How to Read a Food Label to Limit Sodium: Care Instructions. \" Current as of: July 26, 2016 Content Version: 11.2 © 8581-6166 [employer]. Care instructions adapted under license by [employer] (which disclaims liability or warranty for this information). If you have questions about a medical condition or this instruction, always ask your healthcare professional. [place] [address] any warranty or liability for your use of this information. Introducing [facility] [place] [facility]! Dear Sabino Frost: Thank you for requesting a [employer] account. Our records indicate that you already have an active [employer] account. You can access your account anytime at [email or web address] Did you know that you can access your hospital and ER discharge instructions at any time in [employer]? You can also review all of your test results from your hospital stay or ER visit. Additional Information If you have questions, please visit the Frequently Asked Questions section of the [employer] website at [email or web address] Remember, [employer] is NOT to be used for urgent needs. For medical emergencies, dial 911. Now available from your iPhone and Android! Please provide this summary of care documentation to your next provider. Your primary care clinician is listed as Tania Rinne.  If you have any questions after today's visit, please call [phone].

## 2017-06-13 NOTE — PATIENT INSTRUCTIONS
Please discuss stopping trimethropim-sulfamethoxazole with dermatologist.     Continue flonase for allergies, add in antihistamine for this season to help reduce headaches. For leg swelling. Monitor salt and avoid salty foods. Try and take an extra walk/stretch at least 1 time a work, if possible do this 2 times. Consider adding compression stockings. How to Read a Food Label to Limit Sodium: Care Instructions  Your Care Instructions  Sodium causes your body to hold on to extra water. This can raise your blood pressure and force your heart and kidneys to work harder. In very serious cases, this could cause you to be put in the hospital. It might even be life-threatening. By limiting sodium, you will feel better and lower your risk of serious problems. Processed foods, fast food, and restaurant foods are the major sources of dietary sodium. The most common name for sodium is salt. Try to limit how much sodium you eat to less than 2,300 milligrams (mg) a day. If you limit your sodium to 1,500 mg a day, you can lower your blood pressure even more. This limit counts all the salt that you eat in foods you cook or in packaged foods. Keep a list of everything you eat and drink. Follow-up care is a key part of your treatment and safety. Be sure to make and go to all appointments, and call your doctor if you are having problems. It's also a good idea to know your test results and keep a list of the medicines you take. How can you care for yourself at home? Read ingredient lists on food labels  · Read the list of ingredients on food labels to help you find how much sodium is in a food. The label lists the ingredients in a food in descending order (from the most to the least). If salt or sodium is high on the list, there may be a lot of sodium in the food. · Know that sodium has different names.  Sodium is also called monosodium glutamate (MSG, common in [place] food), sodium citrate, sodium alginate, sodium hydroxide, and sodium phosphate. Read Nutrition Facts labels  · On most foods, there is a Nutrition Facts label. This will tell you how much sodium is in one serving of food. Look at both the serving size and the sodium amount. The serving size is located at the top of the label, usually right under the \"Nutrition Facts\" title. The amount of sodium is given in the list under the title. It is given in milligrams (mg). ¨ Check the serving size carefully. A single serving is often very small, and you may eat more than one serving. If this is the case, you will eat more sodium than listed on the label. For example, if the serving size for a canned soup is 1 cup and the sodium amount is 470 mg, if you have 2 cups you will eat 940 mg of sodium. · The nutrition facts for fresh fruits and vegetables are not listed on the food. They may be listed somewhere in the store. These foods usually have no sodium or low sodium. · The Nutrition Facts label also gives you the Percent Daily Value for sodium. This is how much of the recommended amount of sodium a serving contains. The daily value for sodium is less than 2,300 mg. So if the Percent Daily Value says 50%, this means one serving is giving you half of this, or 1,150 mg. Buy low-sodium foods  · Look for foods that are made with less sodium. Watch for the following words on the label. ¨ \"Unsalted\" means there is no sodium added to the food. But there may be sodium already in the food naturally. ¨ \"Sodium-free\" means a serving has less than 5 mg of sodium. ¨ \"Very low sodium\" means a serving has 35 mg or less of sodium. ¨ \"Low-sodium\" means a serving has 140 mg or less of sodium. · \"Reduced-sodium\" means that there is 25% less sodium than what the food normally has. This is still usually too much sodium. Try not to buy foods with this on the label. · Buy fresh vegetables, or frozen vegetables without added sauces.  Buy low-sodium versions of canned vegetables, soups, and other canned goods. Where can you learn more? Go to http://temla-wendy.info/. Enter 26 [id number] in the search box to learn more about \"How to Read a Food Label to Limit Sodium: Care Instructions. \"  Current as of: July 26, 2016  Content Version: 11.2  © 9142-5805 [employer]. Care instructions adapted under license by [employer] (which disclaims liability or warranty for this information). If you have questions about a medical condition or this instruction, always ask your healthcare professional. [place] [address] any warranty or liability for your use of this information.

## 2017-06-14 LAB
ALBUMIN SERPL-MCNC: 4.2 G/DL (ref 3.6–4.8)
ALBUMIN/GLOB SERPL: 1.8 {RATIO} (ref 1.2–2.2)
ALP SERPL-CCNC: 80 IU/L (ref 39–117)
ALT SERPL-CCNC: 20 IU/L (ref 0–32)
AST SERPL-CCNC: 21 IU/L (ref 0–40)
BILIRUB SERPL-MCNC: 0.2 MG/DL (ref 0–1.2)
BUN SERPL-MCNC: 14 MG/DL (ref 8–27)
BUN/CREAT SERPL: 18 (ref 12–28)
CALCIUM SERPL-MCNC: 9 MG/DL (ref 8.7–10.3)
CHLORIDE SERPL-SCNC: 104 MMOL/L (ref 96–106)
CO2 SERPL-SCNC: 22 MMOL/L (ref 18–29)
CREAT SERPL-MCNC: 0.8 MG/DL (ref 0.57–1)
ERYTHROCYTE [DISTWIDTH] IN BLOOD BY AUTOMATED COUNT: 13.5 % (ref 12.3–15.4)
GLOBULIN SER CALC-MCNC: 2.3 G/DL (ref 1.5–4.5)
GLUCOSE SERPL-MCNC: 91 MG/DL (ref 65–99)
HCT VFR BLD AUTO: 34.7 % (ref 34–46.6)
HGB BLD-MCNC: 11.3 G/DL (ref 11.1–15.9)
MCH RBC QN AUTO: 29.5 PG (ref 26.6–33)
MCHC RBC AUTO-ENTMCNC: 32.6 G/DL (ref 31.5–35.7)
MCV RBC AUTO: 91 FL (ref 79–97)
PLATELET # BLD AUTO: 248 X10E3/UL (ref 150–379)
POTASSIUM SERPL-SCNC: 4.3 MMOL/L (ref 3.5–5.2)
PROT SERPL-MCNC: 6.5 G/DL (ref 6–8.5)
RBC # BLD AUTO: 3.83 X10E6/UL (ref 3.77–5.28)
SODIUM SERPL-SCNC: 140 MMOL/L (ref 134–144)
WBC # BLD AUTO: 4.6 X10E3/UL (ref 3.4–10.8)

## 2017-06-29 ENCOUNTER — HOSPITAL ENCOUNTER (OUTPATIENT)
Dept: MAMMOGRAPHY | Age: 62
Discharge: HOME | End: 2017-06-29
Payer: COMMERCIAL

## 2017-06-29 DIAGNOSIS — Z12.31 VISIT FOR SCREENING MAMMOGRAM: ICD-10-CM

## 2017-06-29 PROCEDURE — 77067 SCR MAMMO BI INCL CAD: CPT

## 2018-01-17 DIAGNOSIS — E78.5 HYPERLIPIDEMIA, UNSPECIFIED HYPERLIPIDEMIA TYPE: ICD-10-CM

## 2018-01-17 RX ORDER — ATORVASTATIN CALCIUM 20 MG/1
TABLET, FILM COATED ORAL
Qty: 90 TAB | Refills: 3 | Status: SHIPPED | OUTPATIENT
Start: 2018-01-17 | End: 2018-12-29 | Stop reason: SDUPTHER

## 2018-07-06 ENCOUNTER — HOSPITAL ENCOUNTER (OUTPATIENT)
Dept: MAMMOGRAPHY | Age: 63
Discharge: HOME | End: 2018-07-06
Payer: COMMERCIAL

## 2018-07-06 DIAGNOSIS — Z12.39 SCREENING BREAST EXAMINATION: ICD-10-CM

## 2018-07-06 PROCEDURE — 77067 SCR MAMMO BI INCL CAD: CPT

## 2018-12-29 DIAGNOSIS — E78.5 HYPERLIPIDEMIA, UNSPECIFIED HYPERLIPIDEMIA TYPE: ICD-10-CM

## 2018-12-31 RX ORDER — ATORVASTATIN CALCIUM 20 MG/1
TABLET, FILM COATED ORAL
Qty: 90 TAB | Refills: 3 | Status: SHIPPED | OUTPATIENT
Start: 2018-12-31

## 2019-07-09 ENCOUNTER — HOSPITAL ENCOUNTER (OUTPATIENT)
Dept: MAMMOGRAPHY | Age: 64
Discharge: HOME | End: 2019-07-09
Payer: COMMERCIAL

## 2019-07-09 DIAGNOSIS — Z12.39 BREAST SCREENING: ICD-10-CM

## 2019-07-09 PROCEDURE — 77067 SCR MAMMO BI INCL CAD: CPT

## 2020-07-23 ENCOUNTER — HOSPITAL ENCOUNTER (OUTPATIENT)
Dept: MAMMOGRAPHY | Age: 65
Discharge: HOME | End: 2020-07-23
Payer: COMMERCIAL

## 2020-07-23 DIAGNOSIS — Z12.31 VISIT FOR SCREENING MAMMOGRAM: ICD-10-CM

## 2020-07-23 PROCEDURE — 77067 SCR MAMMO BI INCL CAD: CPT | Performed by: FAMILY MEDICINE

## 2021-02-08 ENCOUNTER — HOSPITAL ENCOUNTER (OUTPATIENT)
Dept: PREADMISSION TESTING | Age: 66
Discharge: HOME | End: 2021-02-08

## 2021-02-08 NOTE — PERIOP NOTES
Patient did not show for covid testing as scheduled. Patient called and wants procedure rescheduled. Spoke to Marv at Dr. Chelle Walters office and they will reschedule patient's procedure.

## 2021-04-12 ENCOUNTER — HOSPITAL ENCOUNTER (OUTPATIENT)
Dept: PREADMISSION TESTING | Age: 66
Discharge: HOME | End: 2021-04-12
Payer: COMMERCIAL

## 2021-04-12 LAB — SARS-COV-2, COV2: NORMAL

## 2021-04-12 PROCEDURE — U0003 INFECTIOUS AGENT DETECTION BY NUCLEIC ACID (DNA OR RNA); SEVERE ACUTE RESPIRATORY SYNDROME CORONAVIRUS 2 (SARS-COV-2) (CORONAVIRUS DISEASE [COVID-19]), AMPLIFIED PROBE TECHNIQUE, MAKING USE OF HIGH THROUGHPUT TECHNOLOGIES AS DESCRIBED BY CMS-2020-01-R: HCPCS

## 2021-04-13 LAB — SARS-COV-2, COV2NT: NOT DETECTED

## 2021-04-16 ENCOUNTER — HOSPITAL ENCOUNTER (OUTPATIENT)
Age: 66
Setting detail: OUTPATIENT SURGERY
Discharge: HOME | End: 2021-04-16
Attending: SPECIALIST | Admitting: SPECIALIST
Payer: COMMERCIAL

## 2021-04-16 ENCOUNTER — ANESTHESIA EVENT (OUTPATIENT)
Dept: ENDOSCOPY | Age: 66
End: 2021-04-16
Payer: COMMERCIAL

## 2021-04-16 ENCOUNTER — ANESTHESIA (OUTPATIENT)
Dept: ENDOSCOPY | Age: 66
End: 2021-04-16
Payer: COMMERCIAL

## 2021-04-16 VITALS
WEIGHT: 151.44 LBS | HEIGHT: 62 IN | TEMPERATURE: 97.9 F | HEART RATE: 60 BPM | BODY MASS INDEX: 27.87 KG/M2 | OXYGEN SATURATION: 100 % | DIASTOLIC BLOOD PRESSURE: 61 MMHG | RESPIRATION RATE: 16 BRPM | SYSTOLIC BLOOD PRESSURE: 122 MMHG

## 2021-04-16 PROCEDURE — 76060000031 HC ANESTHESIA FIRST 0.5 HR: Performed by: SPECIALIST

## 2021-04-16 PROCEDURE — 74011250636 HC RX REV CODE- 250/636: Performed by: SPECIALIST

## 2021-04-16 PROCEDURE — 2709999900 HC NON-CHARGEABLE SUPPLY: Performed by: SPECIALIST

## 2021-04-16 PROCEDURE — 76040000019: Performed by: SPECIALIST

## 2021-04-16 PROCEDURE — 74011000250 HC RX REV CODE- 250: Performed by: NURSE ANESTHETIST, CERTIFIED REGISTERED

## 2021-04-16 RX ORDER — SODIUM CHLORIDE 0.9 % (FLUSH) 0.9 %
5-40 SYRINGE (ML) INJECTION EVERY 8 HOURS
Status: DISCONTINUED | OUTPATIENT
Start: 2021-04-16 | End: 2021-04-16 | Stop reason: HOSPADM

## 2021-04-16 RX ORDER — LIDOCAINE HYDROCHLORIDE 20 MG/ML
INJECTION, SOLUTION EPIDURAL; INFILTRATION; INTRACAUDAL; PERINEURAL AS NEEDED
Status: DISCONTINUED | OUTPATIENT
Start: 2021-04-16 | End: 2021-04-16 | Stop reason: HOSPADM

## 2021-04-16 RX ORDER — SODIUM CHLORIDE 9 MG/ML
50 INJECTION, SOLUTION INTRAVENOUS CONTINUOUS
Status: DISCONTINUED | OUTPATIENT
Start: 2021-04-16 | End: 2021-04-16 | Stop reason: HOSPADM

## 2021-04-16 RX ORDER — DEXTROMETHORPHAN/PSEUDOEPHED 2.5-7.5/.8
1.2 DROPS ORAL
Status: DISCONTINUED | OUTPATIENT
Start: 2021-04-16 | End: 2021-04-16 | Stop reason: HOSPADM

## 2021-04-16 RX ORDER — SODIUM CHLORIDE 0.9 % (FLUSH) 0.9 %
5-40 SYRINGE (ML) INJECTION AS NEEDED
Status: DISCONTINUED | OUTPATIENT
Start: 2021-04-16 | End: 2021-04-16 | Stop reason: HOSPADM

## 2021-04-16 RX ADMIN — LIDOCAINE HYDROCHLORIDE 20 MG: 20 INJECTION, SOLUTION EPIDURAL; INFILTRATION; INTRACAUDAL; PERINEURAL at 07:43

## 2021-04-16 RX ADMIN — LIDOCAINE HYDROCHLORIDE 20 MG: 20 INJECTION, SOLUTION EPIDURAL; INFILTRATION; INTRACAUDAL; PERINEURAL at 07:38

## 2021-04-16 RX ADMIN — LIDOCAINE HYDROCHLORIDE 20 MG: 20 INJECTION, SOLUTION EPIDURAL; INFILTRATION; INTRACAUDAL; PERINEURAL at 07:35

## 2021-04-16 RX ADMIN — LIDOCAINE HYDROCHLORIDE 100 MG: 20 INJECTION, SOLUTION EPIDURAL; INFILTRATION; INTRACAUDAL; PERINEURAL at 07:32

## 2021-04-16 RX ADMIN — SODIUM CHLORIDE: 900 INJECTION, SOLUTION INTRAVENOUS at 07:19

## 2021-04-16 RX ADMIN — LIDOCAINE HYDROCHLORIDE 20 MG: 20 INJECTION, SOLUTION EPIDURAL; INFILTRATION; INTRACAUDAL; PERINEURAL at 07:41

## 2021-04-16 NOTE — ANESTHESIA PREPROCEDURE EVALUATION
Relevant Problems   GASTROINTESTINAL   (+) GERD (gastroesophageal reflux disease)      HEMATOLOGY   (+) H/O Mild Anemia       Anesthetic History   No history of anesthetic complications            Review of Systems / Medical History  Patient summary reviewed, nursing notes reviewed and pertinent labs reviewed    Pulmonary  Within defined limits                 Neuro/Psych     seizures    Headaches    Comments: \"had sz after surgery one time\" Cardiovascular                  Exercise tolerance: >4 METS     GI/Hepatic/Renal     GERD           Endo/Other        Arthritis and anemia     Other Findings   Comments: Vasovagal syncope  Recurrent Vaginal Candidiasis   Degenerative Cervical Spondylosis w/ B Osteophyte Encroachment   Hx Hyperchloremia  Hx SCCA  Vitamin D deficiency   Allergic rhinitis                Physical Exam    Airway  Mallampati: III    Neck ROM: normal range of motion   Mouth opening: Normal     Cardiovascular  Regular rate and rhythm,  S1 and S2 normal,  no murmur, click, rub, or gallop             Dental    Dentition: Bridges     Pulmonary  Breath sounds clear to auscultation               Abdominal  GI exam deferred       Other Findings            Anesthetic Plan    ASA: 2  Anesthesia type: total IV anesthesia          Induction: Intravenous  Anesthetic plan and risks discussed with: Patient

## 2021-04-16 NOTE — ANESTHESIA POSTPROCEDURE EVALUATION
Procedure(s):  COLONOSCOPY.    total IV anesthesia    Anesthesia Post Evaluation        Patient location during evaluation: PACU  Note status: Adequate. Level of consciousness: responsive to verbal stimuli and sleepy but conscious  Pain management: satisfactory to patient  Airway patency: patent  Anesthetic complications: no  Cardiovascular status: acceptable  Respiratory status: acceptable  Hydration status: acceptable  Comments: +Post-Anesthesia Evaluation and Assessment    Patient: Jhonny Kumar MRN: [id number]  SSN: [id number]   YOB: 1955  Age: 77 y.o. Sex: female      Cardiovascular Function/Vital Signs    BP (!) 118/55   Pulse 65   Temp 36.8 °C (98.3 °F)   Resp 14   Ht 5' 2\" (1.575 m)   Wt 68.7 kg (151 lb 7 oz)   SpO2 99%   Breastfeeding No   BMI 27.70 kg/m²     Patient is status post Procedure(s):  COLONOSCOPY. Nausea/Vomiting: Controlled. Postoperative hydration reviewed and adequate. Pain:  Pain Scale 1: Numeric (0 - 10) (04/16/21 0754)  Pain Intensity 1: 0 (04/16/21 0754)   Managed. Neurological Status: At baseline. Mental Status and Level of Consciousness: Arousable. Pulmonary Status:   O2 Device: None (Room air) (04/16/21 0754)   Adequate oxygenation and airway patent. Complications related to anesthesia: None    Post-anesthesia assessment completed. No concerns. Signed By: Dinorah Cameron MD    4/16/2021  Post anesthesia nausea and vomiting:  controlled      INITIAL Post-op Vital signs: No vitals data found for the desired time range.

## 2021-04-16 NOTE — DISCHARGE INSTRUCTIONS
Ricardo Calderon  [id number]  1955    COLON DISCHARGE INSTRUCTIONS  Discomfort:  Redness at IV site- apply warm compress to area; if redness or soreness persist- contact your physician  There may be a slight amount of blood passed from the rectum  Gaseous discomfort- walking, belching will help relieve any discomfort  You may not operate a vehicle for 12 hours  You may not engage in an occupation involving machinery or appliances for rest of today  You may not drink alcoholic beverages for at least 12 hours  Avoid making any critical decisions for at least 24 hour  DIET:   Regular diet. - however -  remember your colon is empty and a heavy meal will produce gas. Avoid these foods:  vegetables, fried / greasy foods, carbonated drinks for today. MEDICATIONS:        Regarding Aspirin or Nonsteroidal medications, please see below. ACTIVITY:  You may resume your normal daily activities it is recommended that you spend the remainder of the day resting -  avoid any strenuous activity. CALL M.D. ANY SIGN OF:  Increasing pain, nausea, vomiting  Abdominal distension (swelling)  New increased bleeding (oral or rectal)  Fever (chills)  Pain in chest area  Bloody discharge from nose or mouth  Shortness of breath     ONLY  Tylenol as needed for pain.       Follow-up Instructions:   Call Dr. Jaun Hurtado for questions about procedure at telephone #  [phone]              Repeat Colonoscopy in 10 years

## 2021-04-16 NOTE — PROGRESS NOTES
Matthew Silvia  1955  [id number]    Situation:  Verbal report received from: Mahnaz Monroe RN  Procedure: Procedure(s):  COLONOSCOPY    Background:    Preoperative diagnosis: SCREENING, FAMILY HX OF MALIGNANT NEOPLASM OF GASTROINTESTINAL TRACT  Postoperative diagnosis: Internal Hemorrhoids    :  Dr. Vicenta Fowler  Assistant(s): Endoscopy RN-1: Kristie Rodríguez RN  Endoscopy RN-2: Ray Liu RN    Specimens: * No specimens in log *  H. Pylori  no    Assessment:  Intra-procedure medications     Anesthesia gave intra-procedure sedation and medications, see anesthesia flow sheet yes    Intravenous fluids: NS@ KVO     Vital signs stable      Abdominal assessment: round and soft      Recommendation:  Discharge patient per MD order .   Family or Friend    Permission to share finding with family or friend yes

## 2021-04-16 NOTE — H&P
Gastroenterology Outpatient History and Physical    Patient: El Noriega    Physician: Narcisa Montez MD    Vital Signs: Blood pressure (!) 151/77, pulse 63, temperature 98.3 °F (36.8 °C), resp. rate 12, height 5' 2\" (1.575 m), weight 68.7 kg (151 lb 7 oz), SpO2 100 %, not currently breastfeeding. Allergies: Allergies   Allergen Reactions    Codeine Other (comments)     Confusion, disoriented      Effexor [Venlafaxine] Other (comments)     Chest tightness, nausea       Chief Complaint: Screening    History of Present Illness: 76 yo F for screening colon.     Justification for Procedure: above    History:  Past Medical History:   Diagnosis Date    Acne 6/14/2011    Adverse effect of anesthesia     \"had sz after surgery one time\"    Allergic rhinitis 6/14/2011    Anemia 6/14/2011    Cancer (HCC)     squamous cell, left ear    Degenerative Cervical Spondylosis w/ B Osteophyte Encroachment 12/19/2011    GERD (gastroesophageal reflux disease)     Hyperchloremia     Migraines 6/14/2011    Recurrent Vaginal Candidiasis 12/19/2011    Right carpal tunnel syndrome 6/14/2011    Seizures ([address]     patient reports, \"had sz after a surgery\"    Vasovagal syncope 6/14/2011    Vitamin D deficiency 6/14/2011      Past Surgical History:   Procedure Laterality Date    COLONOSCOPY  7/2007    Normal, f/u 5 yrs; Dr Tawanna Dutta  [id number]    NSR, 1st degree AV Block, rates 60, 61, 61    EKG ORDERABLE  2008    Sinus Bradycardia, rate 57, 1st degree AV block    EMG TWO EXTREMITIES UPPER  1/2007, Dr Levi Urbina    mild median nerve neuropathy    HX BREAST BIOPSY Right 6/6475, Dr Jimenez Hillman    needle aspiration 2 benign cysts right breast; Fibroadenoma    HX COLONOSCOPY  9/2012, Dr Nelida Barron    Normal, f/u 5 yrs d/t FHx    HX HEENT      HX HEMORRHOIDECTOMY      HX ADITYA AND BSO  1/97    uterine fibroids, hrt     HX TONSILLECTOMY  age 16    HX TUBAL LIGATION  12/96    NE BIOPSY OF SKIN LESION 1/2010    SCC of left EAC    NH REPAIR  ANAL FISTULA,RECT ADV FLAP      external anal fistula repair with lateral sphincterotomy    XR UPPER GI SERIES W KUB  8/2004    normal UGI and normal CXR      Social History     Socioeconomic History    Marital status:      Spouse name: Not on file    Number of children: 0    Years of education: Not on file    Highest education level: Not on file   Occupational History    Occupation: Adm Asst, for [employer]     Employer: UNKNOWN   Tobacco Use    Smoking status: Never Smoker    Smokeless tobacco: Never Used   Substance and Sexual Activity    Alcohol use: Yes     Comment: very occasional glass of wine ~ 1 x a month at most    Drug use: No    Sexual activity: Yes     Partners: Male     Birth control/protection: None   Other Topics Concern    Caffeine Concern No     Comment: no coffee, 1 serving of diet green tea a day, no sodas    Weight Concern No     Comment: personal goal is in the 140's    Special Diet Yes     Comment: low cholesterol, pretty well balanced    Exercise Yes     Comment: 4-5 x a week: walks on treadmill x 20 minutes, antoinette rider/stretches x 200 reps a day      Family History   Problem Relation Age of Onset    Cancer Mother         lung    Elevated Lipids Mother     Heart Disease Father     Diabetes Paternal Grandfather     Hypertension Sister     Elevated Lipids Sister     Diabetes Brother     Hypertension Brother     Breast Cancer Paternal Aunt 36    Colon Cancer Paternal Aunt        Medications:   Prior to Admission medications    Medication Sig Start Date End Date Taking? Authorizing Provider   atorvastatin (LIPITOR) 20 mg tablet TAKE 1 TABLET DAILY 12/31/18  Yes Melida Leahy MD   Estradiol (EVAMIST) 1.53 mg/spray (1.7%) spry 1 Spray by Apply Externally route daily. 1/24/17  Yes Melida Leahy MD   cholecalciferol, vitamin D3, (VITAMIN D3) 2,000 unit tab Take 5,000 Units by mouth.  Takes a few x a week   Yes Provider, Historical   MULTIVITS W-CA,FE,OTHER MIN (WOMEN'S DAILY MULTIVITAMIN PO) Take  by mouth. Yes Provider, Historical   butalbital-acetaminophen-caffeine (FIORICET, ESGIC) -40 mg per tablet TAKE 1 TABLET EVERY 4 HOURS AS NEEDED FOR PAIN, MAXIMUM DAILY AMOUNT 6 TABLETS 10/19/17   Sraai Whipple MD   fexofenadine (ALLEGRA) 180 mg tablet Take 1 Tab by mouth daily as needed for Allergies. 6/26/17   Sarai Whipple MD   omeprazole (PRILOSEC) 20 mg capsule Take 1 Cap by mouth daily. 6/13/17   Sarai Whipple MD   trimethoprim-sulfamethoxazole (BACTRIM DS, SEPTRA DS) 160-800 mg per tablet Take 1 Tab by mouth two (2) times a day. Provider, Historical   fluticasone (FLONASE) 50 mcg/actuation nasal spray 2 Sprays by Both Nostrils route daily. 11/23/16   Sarai Whipple MD       Physical Exam:   General: alert, no distress   HEENT: Head: Normocephalic, no lesions, without obvious abnormality.    Heart: regular rate and rhythm, S1, S2 normal, no murmur, click, rub or gallop   Lungs: chest clear, no wheezing, rales, normal symmetric air entry   Abdominal: soft, NT/ND +BS   Neurological: Grossly normal   Extremities: extremities normal, atraumatic, no cyanosis or edema     Findings/Diagnosis: CRC screening    Plan of Care/Planned Procedure: Colonoscopy

## 2021-04-16 NOTE — PROCEDURES
Colonoscopy Procedure Note    Indications:   Screening colonoscopy    Referring Physician: Chris Moya MD  Anesthesia/Sedation: MAC anesthesia Propofol  Endoscopist:  Dr. Estelle Molina    Procedure in Detail:  Informed consent was obtained for the procedure, including sedation. Risks of perforation, hemorrhage, adverse drug reaction, and aspiration were discussed. The patient was placed in the left lateral decubitus position. Based on the pre-procedure assessment, including review of the patient's medical history, medications, allergies, and review of systems, she had been deemed to be an appropriate candidate for moderate sedation; she was therefore sedated with the medications listed above. The patient was monitored continuously with ECG tracing, pulse oximetry, blood pressure monitoring, and direct observations. A rectal examination was performed. The [id number] was inserted into the rectum and advanced under direct vision to the cecum, which was identified by the ileocecal valve and appendiceal orifice. The quality of the colonic preparation was adequate. A careful inspection was made as the colonoscope was withdrawn, including a retroflexed view of the rectum; findings and interventions are described below. Appropriate photodocumentation was obtained. Findings:   1. Scope advanced to the cecum and terminal ileum. 2.  Preparation was adequate. 3.  No polyps. 4.  Normal mucosa to the cecum. 5.  Small internal hemorrhoids. Therapies:  none    Specimen:  none     Complications: None were encountered during the procedure. EBL: < 10 ml.     Recommendations:   -Repeat Colonoscopy in 10 years    Signed By: Marcelina Balderas MD                        April 16, 2021

## 2021-06-16 ENCOUNTER — TRANSCRIBE ORDER (OUTPATIENT)
Dept: REGISTRATION | Age: 66
End: 2021-06-16

## 2021-06-16 DIAGNOSIS — Z01.812 PRE-PROCEDURE LAB EXAM: Primary | ICD-10-CM

## 2021-06-22 ENCOUNTER — HOSPITAL ENCOUNTER (OUTPATIENT)
Dept: PREADMISSION TESTING | Age: 66
Discharge: HOME | End: 2021-06-22
Payer: COMMERCIAL

## 2021-06-22 VITALS
BODY MASS INDEX: 26.68 KG/M2 | HEART RATE: 71 BPM | TEMPERATURE: 98.1 F | WEIGHT: 150.57 LBS | OXYGEN SATURATION: 99 % | DIASTOLIC BLOOD PRESSURE: 71 MMHG | HEIGHT: 63 IN | SYSTOLIC BLOOD PRESSURE: 123 MMHG

## 2021-06-22 LAB
BASOPHILS # BLD: 0 K/UL (ref 0–0.1)
BASOPHILS NFR BLD: 1 % (ref 0–1)
DIFFERENTIAL METHOD BLD: NORMAL
EOSINOPHIL # BLD: 0.2 K/UL (ref 0–0.4)
EOSINOPHIL NFR BLD: 3 % (ref 0–7)
ERYTHROCYTE [DISTWIDTH] IN BLOOD BY AUTOMATED COUNT: 13 % (ref 11.5–14.5)
HCT VFR BLD AUTO: 36.6 % (ref 35–47)
HGB BLD-MCNC: 11.7 G/DL (ref 11.5–16)
IMM GRANULOCYTES # BLD AUTO: 0 K/UL (ref 0–0.04)
IMM GRANULOCYTES NFR BLD AUTO: 0 % (ref 0–0.5)
LYMPHOCYTES # BLD: 1.7 K/UL (ref 0.8–3.5)
LYMPHOCYTES NFR BLD: 38 % (ref 12–49)
MCH RBC QN AUTO: 30.4 PG (ref 26–34)
MCHC RBC AUTO-ENTMCNC: 32 G/DL (ref 30–36.5)
MCV RBC AUTO: 95.1 FL (ref 80–99)
MONOCYTES # BLD: 0.4 K/UL (ref 0–1)
MONOCYTES NFR BLD: 9 % (ref 5–13)
NEUTS SEG # BLD: 2.1 K/UL (ref 1.8–8)
NEUTS SEG NFR BLD: 49 % (ref 32–75)
NRBC # BLD: 0 K/UL (ref 0–0.01)
NRBC BLD-RTO: 0 PER 100 WBC
PLATELET # BLD AUTO: 247 K/UL (ref 150–400)
PMV BLD AUTO: 10 FL (ref 8.9–12.9)
RBC # BLD AUTO: 3.85 M/UL (ref 3.8–5.2)
WBC # BLD AUTO: 4.4 K/UL (ref 3.6–11)

## 2021-06-22 PROCEDURE — 85025 COMPLETE CBC W/AUTO DIFF WBC: CPT

## 2021-06-22 PROCEDURE — 36415 COLL VENOUS BLD VENIPUNCTURE: CPT

## 2021-06-22 RX ORDER — NORTRIPTYLINE HYDROCHLORIDE 25 MG/1
25 CAPSULE ORAL
COMMUNITY

## 2021-06-27 ENCOUNTER — HOSPITAL ENCOUNTER (OUTPATIENT)
Dept: PREADMISSION TESTING | Age: 66
Discharge: HOME | End: 2021-06-27
Payer: COMMERCIAL

## 2021-06-27 DIAGNOSIS — Z01.812 PRE-PROCEDURE LAB EXAM: ICD-10-CM

## 2021-06-27 PROCEDURE — U0005 INFEC AGEN DETEC AMPLI PROBE: HCPCS

## 2021-06-28 LAB
SARS-COV-2, XPLCVT: NOT DETECTED
SOURCE, COVRS: NORMAL

## 2021-06-29 NOTE — PERIOP NOTES
Patient presented view of covid vaccination card:  Richard Melton - first dose - 2/23/21               Second dose - 3/18/21

## 2021-07-01 ENCOUNTER — ANESTHESIA (OUTPATIENT)
Dept: SURGERY | Age: 66
End: 2021-07-01
Payer: COMMERCIAL

## 2021-07-01 ENCOUNTER — ANESTHESIA EVENT (OUTPATIENT)
Dept: SURGERY | Age: 66
End: 2021-07-01
Payer: COMMERCIAL

## 2021-07-01 ENCOUNTER — HOSPITAL ENCOUNTER (OUTPATIENT)
Age: 66
Setting detail: OUTPATIENT SURGERY
Discharge: HOME | End: 2021-07-01
Attending: OTOLARYNGOLOGY | Admitting: OTOLARYNGOLOGY
Payer: COMMERCIAL

## 2021-07-01 VITALS
BODY MASS INDEX: 26.68 KG/M2 | DIASTOLIC BLOOD PRESSURE: 74 MMHG | OXYGEN SATURATION: 96 % | RESPIRATION RATE: 16 BRPM | WEIGHT: 150.57 LBS | HEIGHT: 63 IN | SYSTOLIC BLOOD PRESSURE: 118 MMHG | HEART RATE: 76 BPM | TEMPERATURE: 97.4 F

## 2021-07-01 DIAGNOSIS — L76.82 INCISIONAL PAIN: Primary | ICD-10-CM

## 2021-07-01 PROBLEM — H61.92 LESION OF EXTERNAL EAR CANAL, LEFT: Status: ACTIVE | Noted: 2021-07-01

## 2021-07-01 PROCEDURE — 74011000250 HC RX REV CODE- 250: Performed by: OTOLARYNGOLOGY

## 2021-07-01 PROCEDURE — 77030008684 HC TU ET CUF COVD -B: Performed by: NURSE ANESTHETIST, CERTIFIED REGISTERED

## 2021-07-01 PROCEDURE — 77030011648 HC PK NSL MEROCL MEDT -B: Performed by: OTOLARYNGOLOGY

## 2021-07-01 PROCEDURE — 76010000131 HC OR TIME 2 TO 2.5 HR: Performed by: OTOLARYNGOLOGY

## 2021-07-01 PROCEDURE — 77030031139 HC SUT VCRL2 J&J -A: Performed by: OTOLARYNGOLOGY

## 2021-07-01 PROCEDURE — 77030002996 HC SUT SLK J&J -A: Performed by: OTOLARYNGOLOGY

## 2021-07-01 PROCEDURE — 76060000035 HC ANESTHESIA 2 TO 2.5 HR: Performed by: OTOLARYNGOLOGY

## 2021-07-01 PROCEDURE — 77030040922 HC BLNKT HYPOTHRM STRY -A

## 2021-07-01 PROCEDURE — 74011250636 HC RX REV CODE- 250/636: Performed by: ANESTHESIOLOGY

## 2021-07-01 PROCEDURE — 77030040356 HC CORD BPLR FRCP COVD -A: Performed by: OTOLARYNGOLOGY

## 2021-07-01 PROCEDURE — 77030006689 HC BLD OPHTH BVR BD -A: Performed by: OTOLARYNGOLOGY

## 2021-07-01 PROCEDURE — 2709999900 HC NON-CHARGEABLE SUPPLY: Performed by: OTOLARYNGOLOGY

## 2021-07-01 PROCEDURE — 76210000000 HC OR PH I REC 2 TO 2.5 HR: Performed by: OTOLARYNGOLOGY

## 2021-07-01 PROCEDURE — 74011250637 HC RX REV CODE- 250/637: Performed by: OTOLARYNGOLOGY

## 2021-07-01 PROCEDURE — 74011250636 HC RX REV CODE- 250/636: Performed by: NURSE ANESTHETIST, CERTIFIED REGISTERED

## 2021-07-01 PROCEDURE — 74011000250 HC RX REV CODE- 250: Performed by: NURSE ANESTHETIST, CERTIFIED REGISTERED

## 2021-07-01 PROCEDURE — 74011250637 HC RX REV CODE- 250/637: Performed by: ANESTHESIOLOGY

## 2021-07-01 PROCEDURE — 76210000020 HC REC RM PH II FIRST 0.5 HR: Performed by: OTOLARYNGOLOGY

## 2021-07-01 PROCEDURE — 77030002974 HC SUT PLN J&J -A: Performed by: OTOLARYNGOLOGY

## 2021-07-01 PROCEDURE — 88331 PATH CONSLTJ SURG 1 BLK 1SPC: CPT

## 2021-07-01 PROCEDURE — 77030013079 HC BLNKT BAIR HGGR 3M -A: Performed by: NURSE ANESTHETIST, CERTIFIED REGISTERED

## 2021-07-01 PROCEDURE — 77030026438 HC STYL ET INTUB CARD -A: Performed by: NURSE ANESTHETIST, CERTIFIED REGISTERED

## 2021-07-01 PROCEDURE — 77030019615 HC ELCTRD EMG NDL MEDT -B: Performed by: OTOLARYNGOLOGY

## 2021-07-01 PROCEDURE — 77030002916 HC SUT ETHLN J&J -A: Performed by: OTOLARYNGOLOGY

## 2021-07-01 PROCEDURE — 77030006932 HC BLD TYMP BVR BD -B: Performed by: OTOLARYNGOLOGY

## 2021-07-01 PROCEDURE — 88304 TISSUE EXAM BY PATHOLOGIST: CPT

## 2021-07-01 PROCEDURE — 88305 TISSUE EXAM BY PATHOLOGIST: CPT

## 2021-07-01 RX ORDER — SODIUM CHLORIDE, SODIUM LACTATE, POTASSIUM CHLORIDE, CALCIUM CHLORIDE 600; 310; 30; 20 MG/100ML; MG/100ML; MG/100ML; MG/100ML
100 INJECTION, SOLUTION INTRAVENOUS CONTINUOUS
Status: DISCONTINUED | OUTPATIENT
Start: 2021-07-01 | End: 2021-07-01 | Stop reason: HOSPADM

## 2021-07-01 RX ORDER — FENTANYL CITRATE 50 UG/ML
25 INJECTION, SOLUTION INTRAMUSCULAR; INTRAVENOUS
Status: DISCONTINUED | OUTPATIENT
Start: 2021-07-01 | End: 2021-07-01 | Stop reason: HOSPADM

## 2021-07-01 RX ORDER — MIDAZOLAM HYDROCHLORIDE 1 MG/ML
1 INJECTION, SOLUTION INTRAMUSCULAR; INTRAVENOUS AS NEEDED
Status: DISCONTINUED | OUTPATIENT
Start: 2021-07-01 | End: 2021-07-01 | Stop reason: HOSPADM

## 2021-07-01 RX ORDER — NEOSTIGMINE METHYLSULFATE 1 MG/ML
INJECTION, SOLUTION INTRAVENOUS AS NEEDED
Status: DISCONTINUED | OUTPATIENT
Start: 2021-07-01 | End: 2021-07-01 | Stop reason: HOSPADM

## 2021-07-01 RX ORDER — SODIUM CHLORIDE 9 MG/ML
25 INJECTION, SOLUTION INTRAVENOUS CONTINUOUS
Status: DISCONTINUED | OUTPATIENT
Start: 2021-07-01 | End: 2021-07-01 | Stop reason: HOSPADM

## 2021-07-01 RX ORDER — DIPHENHYDRAMINE HYDROCHLORIDE 50 MG/ML
12.5 INJECTION, SOLUTION INTRAMUSCULAR; INTRAVENOUS AS NEEDED
Status: DISCONTINUED | OUTPATIENT
Start: 2021-07-01 | End: 2021-07-01 | Stop reason: HOSPADM

## 2021-07-01 RX ORDER — ONDANSETRON 2 MG/ML
4 INJECTION INTRAMUSCULAR; INTRAVENOUS AS NEEDED
Status: DISCONTINUED | OUTPATIENT
Start: 2021-07-01 | End: 2021-07-01 | Stop reason: HOSPADM

## 2021-07-01 RX ORDER — ACETAMINOPHEN 325 MG/1
650 TABLET ORAL ONCE
Status: COMPLETED | OUTPATIENT
Start: 2021-07-01 | End: 2021-07-01

## 2021-07-01 RX ORDER — ONDANSETRON 4 MG/1
4 TABLET, FILM COATED ORAL
Qty: 21 TABLET | Refills: 0 | Status: SHIPPED | OUTPATIENT
Start: 2021-07-01

## 2021-07-01 RX ORDER — ROPIVACAINE HYDROCHLORIDE 5 MG/ML
150 INJECTION, SOLUTION EPIDURAL; INFILTRATION; PERINEURAL AS NEEDED
Status: DISCONTINUED | OUTPATIENT
Start: 2021-07-01 | End: 2021-07-01 | Stop reason: HOSPADM

## 2021-07-01 RX ORDER — CEFAZOLIN SODIUM 1 G/3ML
INJECTION, POWDER, FOR SOLUTION INTRAMUSCULAR; INTRAVENOUS AS NEEDED
Status: DISCONTINUED | OUTPATIENT
Start: 2021-07-01 | End: 2021-07-01 | Stop reason: HOSPADM

## 2021-07-01 RX ORDER — HYDROMORPHONE HYDROCHLORIDE 2 MG/ML
INJECTION, SOLUTION INTRAMUSCULAR; INTRAVENOUS; SUBCUTANEOUS AS NEEDED
Status: DISCONTINUED | OUTPATIENT
Start: 2021-07-01 | End: 2021-07-01 | Stop reason: HOSPADM

## 2021-07-01 RX ORDER — LIDOCAINE HYDROCHLORIDE 20 MG/ML
INJECTION, SOLUTION EPIDURAL; INFILTRATION; INTRACAUDAL; PERINEURAL AS NEEDED
Status: DISCONTINUED | OUTPATIENT
Start: 2021-07-01 | End: 2021-07-01 | Stop reason: HOSPADM

## 2021-07-01 RX ORDER — GLYCOPYRROLATE 0.2 MG/ML
INJECTION INTRAMUSCULAR; INTRAVENOUS AS NEEDED
Status: DISCONTINUED | OUTPATIENT
Start: 2021-07-01 | End: 2021-07-01 | Stop reason: HOSPADM

## 2021-07-01 RX ORDER — SODIUM CHLORIDE, SODIUM LACTATE, POTASSIUM CHLORIDE, CALCIUM CHLORIDE 600; 310; 30; 20 MG/100ML; MG/100ML; MG/100ML; MG/100ML
INJECTION, SOLUTION INTRAVENOUS
Status: DISCONTINUED | OUTPATIENT
Start: 2021-07-01 | End: 2021-07-01 | Stop reason: HOSPADM

## 2021-07-01 RX ORDER — LIDOCAINE HYDROCHLORIDE AND EPINEPHRINE 20; 10 MG/ML; UG/ML
INJECTION, SOLUTION INFILTRATION; PERINEURAL AS NEEDED
Status: DISCONTINUED | OUTPATIENT
Start: 2021-07-01 | End: 2021-07-01 | Stop reason: HOSPADM

## 2021-07-01 RX ORDER — OFLOXACIN 3 MG/ML
4 SOLUTION AURICULAR (OTIC) 2 TIMES DAILY
Qty: 10 ML | Refills: 2 | Status: SHIPPED | OUTPATIENT
Start: 2021-07-01

## 2021-07-01 RX ORDER — DEXAMETHASONE SODIUM PHOSPHATE 4 MG/ML
INJECTION, SOLUTION INTRA-ARTICULAR; INTRALESIONAL; INTRAMUSCULAR; INTRAVENOUS; SOFT TISSUE AS NEEDED
Status: DISCONTINUED | OUTPATIENT
Start: 2021-07-01 | End: 2021-07-01 | Stop reason: HOSPADM

## 2021-07-01 RX ORDER — MIDAZOLAM HYDROCHLORIDE 1 MG/ML
INJECTION, SOLUTION INTRAMUSCULAR; INTRAVENOUS AS NEEDED
Status: DISCONTINUED | OUTPATIENT
Start: 2021-07-01 | End: 2021-07-01 | Stop reason: HOSPADM

## 2021-07-01 RX ORDER — ONDANSETRON 2 MG/ML
INJECTION INTRAMUSCULAR; INTRAVENOUS AS NEEDED
Status: DISCONTINUED | OUTPATIENT
Start: 2021-07-01 | End: 2021-07-01 | Stop reason: HOSPADM

## 2021-07-01 RX ORDER — OXYCODONE HYDROCHLORIDE 5 MG/1
5 TABLET ORAL AS NEEDED
Status: DISCONTINUED | OUTPATIENT
Start: 2021-07-01 | End: 2021-07-01 | Stop reason: HOSPADM

## 2021-07-01 RX ORDER — CEPHALEXIN 500 MG/1
500 CAPSULE ORAL 4 TIMES DAILY
Qty: 40 CAPSULE | Refills: 0 | Status: SHIPPED | OUTPATIENT
Start: 2021-07-01 | End: 2021-07-11

## 2021-07-01 RX ORDER — ROCURONIUM BROMIDE 10 MG/ML
INJECTION, SOLUTION INTRAVENOUS AS NEEDED
Status: DISCONTINUED | OUTPATIENT
Start: 2021-07-01 | End: 2021-07-01 | Stop reason: HOSPADM

## 2021-07-01 RX ORDER — EPHEDRINE SULFATE/0.9% NACL/PF 50 MG/5 ML
5 SYRINGE (ML) INTRAVENOUS AS NEEDED
Status: DISCONTINUED | OUTPATIENT
Start: 2021-07-01 | End: 2021-07-01 | Stop reason: HOSPADM

## 2021-07-01 RX ORDER — SUCCINYLCHOLINE CHLORIDE 20 MG/ML
INJECTION INTRAMUSCULAR; INTRAVENOUS AS NEEDED
Status: DISCONTINUED | OUTPATIENT
Start: 2021-07-01 | End: 2021-07-01 | Stop reason: HOSPADM

## 2021-07-01 RX ORDER — MIDAZOLAM HYDROCHLORIDE 1 MG/ML
0.5 INJECTION, SOLUTION INTRAMUSCULAR; INTRAVENOUS
Status: DISCONTINUED | OUTPATIENT
Start: 2021-07-01 | End: 2021-07-01 | Stop reason: HOSPADM

## 2021-07-01 RX ORDER — LIDOCAINE HYDROCHLORIDE 10 MG/ML
0.1 INJECTION, SOLUTION EPIDURAL; INFILTRATION; INTRACAUDAL; PERINEURAL AS NEEDED
Status: DISCONTINUED | OUTPATIENT
Start: 2021-07-01 | End: 2021-07-01 | Stop reason: HOSPADM

## 2021-07-01 RX ORDER — FENTANYL CITRATE 50 UG/ML
INJECTION, SOLUTION INTRAMUSCULAR; INTRAVENOUS AS NEEDED
Status: DISCONTINUED | OUTPATIENT
Start: 2021-07-01 | End: 2021-07-01 | Stop reason: HOSPADM

## 2021-07-01 RX ORDER — PROPOFOL 10 MG/ML
INJECTION, EMULSION INTRAVENOUS AS NEEDED
Status: DISCONTINUED | OUTPATIENT
Start: 2021-07-01 | End: 2021-07-01 | Stop reason: HOSPADM

## 2021-07-01 RX ORDER — MORPHINE SULFATE 2 MG/ML
2 INJECTION, SOLUTION INTRAMUSCULAR; INTRAVENOUS
Status: DISCONTINUED | OUTPATIENT
Start: 2021-07-01 | End: 2021-07-01 | Stop reason: HOSPADM

## 2021-07-01 RX ORDER — SODIUM CHLORIDE, SODIUM LACTATE, POTASSIUM CHLORIDE, CALCIUM CHLORIDE 600; 310; 30; 20 MG/100ML; MG/100ML; MG/100ML; MG/100ML
1000 INJECTION, SOLUTION INTRAVENOUS CONTINUOUS
Status: DISCONTINUED | OUTPATIENT
Start: 2021-07-01 | End: 2021-07-01 | Stop reason: HOSPADM

## 2021-07-01 RX ORDER — OXYCODONE AND ACETAMINOPHEN 5; 325 MG/1; MG/1
1 TABLET ORAL
Qty: 21 TABLET | Refills: 0 | Status: SHIPPED | OUTPATIENT
Start: 2021-07-01 | End: 2021-07-08

## 2021-07-01 RX ORDER — HYDROMORPHONE HYDROCHLORIDE 1 MG/ML
0.2 INJECTION, SOLUTION INTRAMUSCULAR; INTRAVENOUS; SUBCUTANEOUS
Status: ACTIVE | OUTPATIENT
Start: 2021-07-01 | End: 2021-07-01

## 2021-07-01 RX ORDER — OFLOXACIN 3 MG/ML
SOLUTION AURICULAR (OTIC) AS NEEDED
Status: DISCONTINUED | OUTPATIENT
Start: 2021-07-01 | End: 2021-07-01 | Stop reason: HOSPADM

## 2021-07-01 RX ORDER — FENTANYL CITRATE 50 UG/ML
50 INJECTION, SOLUTION INTRAMUSCULAR; INTRAVENOUS AS NEEDED
Status: DISCONTINUED | OUTPATIENT
Start: 2021-07-01 | End: 2021-07-01 | Stop reason: HOSPADM

## 2021-07-01 RX ADMIN — ONDANSETRON HYDROCHLORIDE 4 MG: 2 INJECTION, SOLUTION INTRAMUSCULAR; INTRAVENOUS at 11:38

## 2021-07-01 RX ADMIN — ROCURONIUM BROMIDE 5 MG: 10 SOLUTION INTRAVENOUS at 10:03

## 2021-07-01 RX ADMIN — PROPOFOL 120 MG: 10 INJECTION, EMULSION INTRAVENOUS at 10:03

## 2021-07-01 RX ADMIN — SUCCINYLCHOLINE CHLORIDE 80 MG: 20 INJECTION, SOLUTION INTRAMUSCULAR; INTRAVENOUS at 10:13

## 2021-07-01 RX ADMIN — GLYCOPYRROLATE 0.4 MG: 0.2 INJECTION, SOLUTION INTRAMUSCULAR; INTRAVENOUS at 11:38

## 2021-07-01 RX ADMIN — FENTANYL CITRATE 50 MCG: 50 INJECTION, SOLUTION INTRAMUSCULAR; INTRAVENOUS at 10:10

## 2021-07-01 RX ADMIN — PROPOFOL 80 MG: 10 INJECTION, EMULSION INTRAVENOUS at 10:10

## 2021-07-01 RX ADMIN — FENTANYL CITRATE 50 MCG: 50 INJECTION, SOLUTION INTRAMUSCULAR; INTRAVENOUS at 10:03

## 2021-07-01 RX ADMIN — SUCCINYLCHOLINE CHLORIDE 120 MG: 20 INJECTION, SOLUTION INTRAMUSCULAR; INTRAVENOUS at 10:06

## 2021-07-01 RX ADMIN — SODIUM CHLORIDE, POTASSIUM CHLORIDE, SODIUM LACTATE AND CALCIUM CHLORIDE: 600; 310; 30; 20 INJECTION, SOLUTION INTRAVENOUS at 09:52

## 2021-07-01 RX ADMIN — DEXAMETHASONE SODIUM PHOSPHATE 8 MG: 4 INJECTION, SOLUTION INTRAMUSCULAR; INTRAVENOUS at 10:25

## 2021-07-01 RX ADMIN — ACETAMINOPHEN 650 MG: 325 TABLET ORAL at 08:42

## 2021-07-01 RX ADMIN — NEOSTIGMINE METHYLSULFATE 3 MG: 1 INJECTION, SOLUTION INTRAVENOUS at 11:38

## 2021-07-01 RX ADMIN — SODIUM CHLORIDE, POTASSIUM CHLORIDE, SODIUM LACTATE AND CALCIUM CHLORIDE 1000 ML: 600; 310; 30; 20 INJECTION, SOLUTION INTRAVENOUS at 08:42

## 2021-07-01 RX ADMIN — HYDROMORPHONE HYDROCHLORIDE 0.5 MG: 2 INJECTION, SOLUTION INTRAMUSCULAR; INTRAVENOUS; SUBCUTANEOUS at 10:31

## 2021-07-01 RX ADMIN — LIDOCAINE HYDROCHLORIDE 60 MG: 20 INJECTION, SOLUTION EPIDURAL; INFILTRATION; INTRACAUDAL; PERINEURAL at 10:03

## 2021-07-01 RX ADMIN — ONDANSETRON 4 MG: 2 INJECTION INTRAMUSCULAR; INTRAVENOUS at 13:22

## 2021-07-01 RX ADMIN — ROCURONIUM BROMIDE 25 MG: 10 SOLUTION INTRAVENOUS at 10:25

## 2021-07-01 RX ADMIN — MIDAZOLAM 2 MG: 1 INJECTION INTRAMUSCULAR; INTRAVENOUS at 10:00

## 2021-07-01 RX ADMIN — CEFAZOLIN 2 G: 330 INJECTION, POWDER, FOR SOLUTION INTRAMUSCULAR; INTRAVENOUS at 10:25

## 2021-07-01 NOTE — BRIEF OP NOTE
Brief Postoperative Note    Patient: Jonathan Rosado  YOB: 1955  MRN: [id number]    Date of Procedure: 7/1/2021     Pre-Op Diagnosis: EXCISION LESION EXTERNAL AUDITORY CANAL, PEDICLE BASED COMPOSITE FLAP EXTERNAL AUDITORY CANAL, NEOPLASM OF UNCERTAIN BEHAVIOR OF SKIN    Post-Op Diagnosis: EXCISION LESION EXTERNAL AUDITORY CANAL, PEDICLE BASED COMPOSITE FLAP EXTERNAL AUDITORY CANAL, NEOPLASM OF UNCERTAIN BEHAVIOR OF SKIN    Procedure(s):  LEFT EAR RADICAL SURGICAL REMOVAL OF LESION OF EXTERNAL AUDITORY CANAL, ADJACENT TISSUE TRANSFER OR REARRANGEMENT EAR DEFECT 10 SQ CM OR LESS        NAME: Jonathan Rosado  MRN: [id number]  DATE: 7/1/2021      PREOPERATIVE DIAGNOSIS: EXCISION LESION EXTERNAL AUDITORY CANAL, PEDICLE BASED COMPOSITE FLAP EXTERNAL AUDITORY CANAL, NEOPLASM OF UNCERTAIN BEHAVIOR OF SKIN  POSTOPERATIVE DIAGNOSIS: EXCISION LESION EXTERNAL AUDITORY CANAL, PEDICLE BASED COMPOSITE FLAP EXTERNAL AUDITORY CANAL, NEOPLASM OF UNCERTAIN BEHAVIOR OF SKIN    PROCEDURES PERFORMED:  Excision of benign skin lesion, 1.4cm, Complex repair of skin defect 1.4cm        PROCEDURE DETAILS:    After informed consent was obtained, the patient was identified, brought to the operating room and place on the operating table. General endotracheal anesthesia was given. An incision was marked on the left indicating a lesion in the left ear canal.   A total of 15ml of 2%lidocaine with 1:100,000 epinephrine was injected in to the skin incision. The patient was prepped and draped in the standard surgical fashion. The incision was made around the ulcerated lesion. After complete dissection, the specimen was sent for frozen and permanent section. The size of the lesion was 1.4 cm. The skin edges would not reapproximate so skin flaps and a rotational flap designed utilizing the conchal bowl skin was used to close the defect. The site was prepped in the field, and lidocaine/epi was injected prior to dissection.  Once adequate skin release had been achieved, the deep dermal layers were reapproximated with 5.0 vicryl and the skin edges were reapproximated with 6.0 fast absorbing in a running  fashion. Bacitracin was applied to the incision. A mericel sponge was fitted, placed in the ear canal and saturated with floxin drops. A ngoc dressing was placed. At the end of the case all sponge, needle, and instrument counts were correct. The patient was returned to the anesthesia staff and transferred to the recovery room in good condition. Surgeon(s):  Rea Young MD    Surgical Assistant: None    Anesthesia: General     Estimated Blood Loss (KZ):6PZ    Complications:none    Specimens:   ID Type Source Tests Collected by Time Destination   1 : Left Ear Canal Lesion Cartilaginous Frozen Section Ear, Left  Rea Young MD 7/1/2021 1042 Pathology        Implants: * No implants in log *    Drains: * No LDAs found *    Findings: ulcerated lesion left cartilaginous canal, frozen section reported radiation changes, no malignant cells seen.     Electronically Signed by Mike Delaney MD on 7/1/2021 at 12:04 PM

## 2021-07-01 NOTE — PERIOP NOTES
Discharge instructions reviewed with  of patient via telephone with patient in attendance. Both patient and  verbalized understanding of discharge instructions.

## 2021-07-01 NOTE — PERIOP NOTES
Attempted to call patient's family (Breonna Donissimone [facility] [phone]) to update about start of procedure and progress at - no answer.

## 2021-07-01 NOTE — ANESTHESIA PREPROCEDURE EVALUATION
Relevant Problems   GASTROINTESTINAL   (+) GERD (gastroesophageal reflux disease)      HEMATOLOGY   (+) H/O Mild Anemia       Anesthetic History   No history of anesthetic complications            Review of Systems / Medical History  Patient summary reviewed, nursing notes reviewed and pertinent labs reviewed    Pulmonary  Within defined limits                 Neuro/Psych     seizures    Headaches     Cardiovascular  Within defined limits                     GI/Hepatic/Renal     GERD           Endo/Other        Arthritis     Other Findings              Physical Exam    Airway  Mallampati: II  TM Distance: > 6 cm  Neck ROM: normal range of motion   Mouth opening: Normal     Cardiovascular  Regular rate and rhythm,  S1 and S2 normal,  no murmur, click, rub, or gallop             Dental  No notable dental hx       Pulmonary  Breath sounds clear to auscultation               Abdominal  GI exam deferred       Other Findings            Anesthetic Plan    ASA: 2  Anesthesia type: general          Induction: Intravenous  Anesthetic plan and risks discussed with: Patient

## 2021-07-01 NOTE — ANESTHESIA POSTPROCEDURE EVALUATION
Procedure(s):  LEFT EAR RADICAL SURGICAL REMOVAL OF LESION OF EXTERNAL AUDITORY CANAL, ADJACENT TISSUE TRANSFER OR REARRANGEMENT EAR DEFECT 10 SQ CM OR LESS. general    Anesthesia Post Evaluation        Patient location during evaluation: PACU  Patient participation: complete - patient participated  Level of consciousness: awake and alert  Pain management: adequate  Airway patency: patent  Anesthetic complications: no  Cardiovascular status: acceptable  Respiratory status: acceptable  Hydration status: acceptable  Comments: I have seen and evaluated the patient and is ready for discharge. Omid Santiago MD    Post anesthesia nausea and vomiting:  none      INITIAL Post-op Vital signs:   Vitals Value Taken Time   /70 07/01/21 1400   Temp 36.1 °C (97 °F) 07/01/21 1207   Pulse 72 07/01/21 1401   Resp 12 07/01/21 1401   SpO2 96 % 07/01/21 1401   Vitals shown include unvalidated device data.

## 2021-07-01 NOTE — OP NOTES
Postoperative Note    Patient: Delta Madrigal  YOB: 1955  MRN: [id number]    Date of Procedure: 7/1/2021     Pre-Op Diagnosis: EXCISION LESION EXTERNAL AUDITORY CANAL, PEDICLE BASED COMPOSITE FLAP EXTERNAL AUDITORY CANAL, NEOPLASM OF UNCERTAIN BEHAVIOR OF SKIN    Post-Op Diagnosis: EXCISION LESION EXTERNAL AUDITORY CANAL, PEDICLE BASED COMPOSITE FLAP EXTERNAL AUDITORY CANAL, NEOPLASM OF UNCERTAIN BEHAVIOR OF SKIN    Procedure(s):  LEFT EAR RADICAL SURGICAL REMOVAL OF LESION OF EXTERNAL AUDITORY CANAL, ADJACENT TISSUE TRANSFER OR REARRANGEMENT EAR DEFECT 10 SQ CM OR LESS        NAME: Delta Madrigal  MRN: [id number]  DATE: 7/1/2021      PREOPERATIVE DIAGNOSIS: EXCISION LESION EXTERNAL AUDITORY CANAL, PEDICLE BASED COMPOSITE FLAP EXTERNAL AUDITORY CANAL, NEOPLASM OF UNCERTAIN BEHAVIOR OF SKIN  POSTOPERATIVE DIAGNOSIS: EXCISION LESION EXTERNAL AUDITORY CANAL, PEDICLE BASED COMPOSITE FLAP EXTERNAL AUDITORY CANAL, NEOPLASM OF UNCERTAIN BEHAVIOR OF SKIN    PROCEDURES PERFORMED:  Excision of benign skin lesion, 1.4cm, Complex repair of skin defect 1.4cm        PROCEDURE DETAILS:    After informed consent was obtained, the patient was identified, brought to the operating room and place on the operating table. General endotracheal anesthesia was given. An incision was marked on the left indicating a lesion in the left ear canal.   A total of 15ml of 2%lidocaine with 1:100,000 epinephrine was injected in to the skin incision. The patient was prepped and draped in the standard surgical fashion. The incision was made around the ulcerated lesion. After complete dissection, the specimen was sent for frozen and permanent section. The size of the lesion was 1.4 cm. The skin edges would not reapproximate so skin flaps and a rotational flap designed utilizing the conchal bowl skin was used to close the defect. The site was prepped in the field, and lidocaine/epi was injected prior to dissection.  Once adequate skin release had been achieved, the deep dermal layers were reapproximated with 5.0 vicryl and the skin edges were reapproximated with 6.0 fast absorbing in a running  fashion. Bacitracin was applied to the incision. A mericel sponge was fitted, placed in the ear canal and saturated with floxin drops. A ngoc dressing was placed. At the end of the case all sponge, needle, and instrument counts were correct. The patient was returned to the anesthesia staff and transferred to the recovery room in good condition. Surgeon(s):  Delta Matthews MD    Surgical Assistant: None    Anesthesia: General     Estimated Blood Loss (VQ):1PV    Complications:none    Specimens:   ID Type Source Tests Collected by Time Destination   1 : Left Ear Canal Lesion Cartilaginous Frozen Section Ear, Left  Delta Matthews MD 7/1/2021 1042 Pathology        Implants: * No implants in log *    Drains: * No LDAs found *    Findings: ulcerated lesion left cartilaginous canal, frozen section reported radiation changes, no malignant cells seen.     Electronically Signed by Jessica Bermudez MD on 7/1/2021 at 12:04 PM

## 2021-07-01 NOTE — PROGRESS NOTES
77year old with history of left ear cancer with excision, radiation, with new lesion left ear canal.  Plan for excision, reconstruction. Risks/benefits/imponderables/alternatives discussed with patient. Patient requests surgery.

## 2021-07-01 NOTE — ROUTINE PROCESS
Patient: Lidia Mcelroy MRN: [id number]  SSN: [id number]   YOB: 1955  Age: 77 y.o. Sex: female     Patient is status post Procedure(s):  LEFT EAR RADICAL SURGICAL REMOVAL OF LESION OF EXTERNAL AUDITORY CANAL, ADJACENT TISSUE TRANSFER OR REARRANGEMENT EAR DEFECT 10 SQ CM OR LESS. Surgeon(s) and Role:     * Andres Smith MD - Primary    Local/Dose/Irrigation:  SEE MAR                  Peripheral IV 07/01/21 Right Antecubital (Active)   Site Assessment Clean, dry, & intact 07/01/21 0825   Phlebitis Assessment 0 07/01/21 0825   Infiltration Assessment 0 07/01/21 0825   Dressing Status Clean, dry, & intact; New 07/01/21 0825   Dressing Type Tape;Transparent 07/01/21 0825   Hub Color/Line Status Pink 07/01/21 0825            Airway - Endotracheal Tube 07/01/21 Oral (Active)                   Dressing/Packing:  Incision 07/01/21 Ear Left-Dressing/Treatment:  (Merocel dressing/packing, cotton ball, ngoc dressing) (07/01/21 1146)    Splint/Cast:  ]    Other:

## 2021-07-01 NOTE — DISCHARGE INSTRUCTIONS
[facility]    Ear Surgery Post Operative Instructions  DO NOT REMOVE PROTECTIVE DRESSING UNTIL FIRST POSTOP VISIT    1. DIET  Start a soft diet and progress to usual diet as tolerated, unless otherwise directed. It is important to remember that good overall diet and health promotes healing. 2.  ACTIVITY  Your activities should be limited as follows until your doctor gives you permission:  A. Avoid lifting heavy objects and any high impact activities  B. Do not blow your nose  C. Do not allow water to enter your ear  D. Do not drive a vehicle or travel long distances (especially to areas of altitude)  E. Do not travel by plane    3. WOUND CARE  A. You may have a [place] dressing (plastic cup with Velcro straps) that covers the ear. B. The [place] dressing should be left in place until your first postop visit. C. Drainage is expected, so the dressing will probably be bloodied. D. The ear canal will have a sponge type packing that has been stitched right inside the opening of the canal.  This may be removed at your first follow-up appointment. E. Your first follow-up appointment will be about 2 weeks after surgery. You will be given ear drops that you should start after your first postop visit. 4.  THINGS TO BE CONCERNED ABOUT  Please call the office for any of these changes  A. Continuous bleeding from the ear after the [place] dressing is removed  B. Fever of 101 or  higher  C. Pain that doesnt respond to medication  D. Severe dizziness or dizziness that persists after the two weeks from surgery  E. Nausea or vomiting    Office Phone:  [phone]  office hours are 8:00 a.m. to 4:30 p.m. You should be able to reach us after hours by calling the regular office number.   If for some reason you are not able to reach our [address] [place] [address] service through this main number you may call them directly at [phone].    ______________________________________________________________________    Anesthesia Discharge Instructions    After general anesthesia or intervenous sedation, for 24 hours or while taking prescription Narcotics:  · Limit your activities  · Do not drive or operate hazardous machinery  · If you have not urinated within 8 hours after discharge, please contact your surgeon on call. · Do not make important personal or business decisions  · Do not drink alcoholic beverages    Report the following to your surgeon:  · Excessive pain, swelling, redness or odor of or around the surgical area  · Temperature over 100.5 degrees  · Nausea and vomiting lasting longer than 4 hours or if unable to take medication  · Any signs of decreased circulation or nerve impairment to extremity:  Change in color, persistent numbness, tingling, coldness or increased pain.   · Any questions

## 2021-07-12 ENCOUNTER — TRANSCRIBE ORDER (OUTPATIENT)
Dept: SCHEDULING | Age: 66
End: 2021-07-12

## 2021-07-12 DIAGNOSIS — Z12.31 VISIT FOR SCREENING MAMMOGRAM: Primary | ICD-10-CM

## 2021-09-07 ENCOUNTER — HOSPITAL ENCOUNTER (OUTPATIENT)
Dept: MAMMOGRAPHY | Age: 66
Discharge: HOME | End: 2021-09-07
Attending: FAMILY MEDICINE
Payer: COMMERCIAL

## 2021-09-07 DIAGNOSIS — Z12.31 VISIT FOR SCREENING MAMMOGRAM: ICD-10-CM

## 2021-09-07 PROCEDURE — 77063 BREAST TOMOSYNTHESIS BI: CPT

## 2022-03-19 PROBLEM — H61.92 LESION OF EXTERNAL EAR CANAL, LEFT: Status: ACTIVE | Noted: 2021-07-01

## 2022-08-18 ENCOUNTER — TRANSCRIBE ORDER (OUTPATIENT)
Dept: SCHEDULING | Age: 67
End: 2022-08-18

## 2022-08-18 DIAGNOSIS — Z12.31 SCREENING MAMMOGRAM FOR HIGH-RISK PATIENT: Primary | ICD-10-CM

## 2022-11-15 ENCOUNTER — OFFICE VISIT (OUTPATIENT)
Dept: URGENT CARE | Age: 67
End: 2022-11-15
Payer: MEDICARE

## 2022-11-15 VITALS
BODY MASS INDEX: 28.08 KG/M2 | DIASTOLIC BLOOD PRESSURE: 75 MMHG | OXYGEN SATURATION: 98 % | HEART RATE: 82 BPM | WEIGHT: 156 LBS | SYSTOLIC BLOOD PRESSURE: 146 MMHG | RESPIRATION RATE: 18 BRPM | TEMPERATURE: 99.4 F

## 2022-11-15 DIAGNOSIS — Z11.59 SCREENING FOR VIRAL DISEASE: ICD-10-CM

## 2022-11-15 DIAGNOSIS — R05.1 ACUTE COUGH: ICD-10-CM

## 2022-11-15 DIAGNOSIS — U07.1 COVID-19: ICD-10-CM

## 2022-11-15 DIAGNOSIS — J06.9 VIRAL URI: Primary | ICD-10-CM

## 2022-11-15 LAB — SARS-COV-2 PCR, POC: POSITIVE

## 2022-11-15 PROCEDURE — G9899 SCRN MAM PERF RSLTS DOC: HCPCS | Performed by: FAMILY MEDICINE

## 2022-11-15 PROCEDURE — G8432 DEP SCR NOT DOC, RNG: HCPCS | Performed by: FAMILY MEDICINE

## 2022-11-15 PROCEDURE — G8400 PT W/DXA NO RESULTS DOC: HCPCS | Performed by: FAMILY MEDICINE

## 2022-11-15 PROCEDURE — 3017F COLORECTAL CA SCREEN DOC REV: CPT | Performed by: FAMILY MEDICINE

## 2022-11-15 PROCEDURE — 99203 OFFICE O/P NEW LOW 30 MIN: CPT | Performed by: FAMILY MEDICINE

## 2022-11-15 PROCEDURE — G8417 CALC BMI ABV UP PARAM F/U: HCPCS | Performed by: FAMILY MEDICINE

## 2022-11-15 PROCEDURE — 1123F ACP DISCUSS/DSCN MKR DOCD: CPT | Performed by: FAMILY MEDICINE

## 2022-11-15 PROCEDURE — G8427 DOCREV CUR MEDS BY ELIG CLIN: HCPCS | Performed by: FAMILY MEDICINE

## 2022-11-15 PROCEDURE — 1090F PRES/ABSN URINE INCON ASSESS: CPT | Performed by: FAMILY MEDICINE

## 2022-11-15 PROCEDURE — 1101F PT FALLS ASSESS-DOCD LE1/YR: CPT | Performed by: FAMILY MEDICINE

## 2022-11-15 PROCEDURE — 87635 SARS-COV-2 COVID-19 AMP PRB: CPT | Performed by: FAMILY MEDICINE

## 2022-11-15 PROCEDURE — G8536 NO DOC ELDER MAL SCRN: HCPCS | Performed by: FAMILY MEDICINE

## 2022-11-16 NOTE — PROGRESS NOTES
Gurpreet Alvarenga is a 79 y.o. female who presents with cough, nasal congestion, sinus drainage x 2 days. Went to [facility] prior to symptom onset. Denies fever, SOB, N/V/D. The history is provided by the patient.       Past Medical History:   Diagnosis Date    Acne 6/14/2011    Adverse effect of anesthesia 2010    \"had sz after surgery one time\"    Allergic rhinitis 6/14/2011    Anemia 6/14/2011    Cancer ([facility] [address]     squamous cell, left ear    COVID-19 vaccine series not completed     PFIZER 2/23/21, 3/18/21    Degenerative Cervical Spondylosis w/ B Osteophyte Encroachment 12/19/2011    GERD (gastroesophageal reflux disease)     Hyperchloremia     Migraines 6/14/2011    Recurrent Vaginal Candidiasis 12/19/2011    Right carpal tunnel syndrome 6/14/2011    Seizures ([facility] [address] 2010    patient reports, \"had sz after a surgery\" LEFT EAR SURGERY    Vasovagal syncope 6/14/2011    Vitamin D deficiency 6/14/2011        Past Surgical History:   Procedure Laterality Date    COLONOSCOPY  7/2007    Normal, f/u 5 yrs; Dr Bela Espinosa    COLONOSCOPY N/A 4/16/2021    COLONOSCOPY performed by Tu Howard MD at [facility] ENDOSCOPY    EKG ORDERABLE  [id number]    NSR, 1st degree AV Block, rates 60, 61, 61    EKG ORDERABLE  2008    Sinus Bradycardia, rate 57, 1st degree AV block    EMG TWO EXTREMITIES UPPER  1/2007, Dr Paulette Jerome    mild median nerve neuropathy    HX BREAST BIOPSY Right 6/9130, Dr Corine West    needle aspiration 2 benign cysts right breast; Fibroadenoma    HX COLONOSCOPY  9/2012, Dr Bela Espinosa    Normal, f/u 5 yrs d/t FHx    HX HEENT Left 2010    REMOVAL LESION EAR CANAL    HX HEMORRHOIDECTOMY      HX ADITYA AND BSO  1/97    uterine fibroids, hrt     HX TONSILLECTOMY  age 16    HX TUBAL LIGATION  12/96    CT BIOPSY OF SKIN LESION  1/2010    SCC of left EAC    CT REPAIR  ANAL FISTULA,RECT ADV FLAP      external anal fistula repair with lateral sphincterotomy    XR UPPER GI SERIES W KUB  8/2004    normal UGI and normal CXR Family History   Problem Relation Age of Onset    Cancer Mother         lung    Elevated Lipids Mother     Heart Disease Father     Diabetes Paternal Grandfather     No Known Problems Sister     Hypertension Brother     Breast Cancer Paternal Aunt 36    Colon Cancer Paternal Aunt     Anesth Problems Neg Hx         Social History     Socioeconomic History    Marital status:      Spouse name: Not on file    Number of children: 0    Years of education: Not on file    Highest education level: Not on file   Occupational History    Occupation: Adm Asst, for [employer]     Employer: UNKNOWN   Tobacco Use    Smoking status: Never    Smokeless tobacco: Never   Vaping Use    Vaping Use: Never used   Substance and Sexual Activity    Alcohol use: Not Currently    Drug use: No    Sexual activity: Yes     Partners: Male     Birth control/protection: None   Other Topics Concern     Service Not Asked    Blood Transfusions Not Asked    Caffeine Concern No     Comment: no coffee, 1 serving of diet green tea a day, no sodas    Occupational Exposure Not Asked    Hobby Hazards Not Asked    Sleep Concern Not Asked    Stress Concern Not Asked    Weight Concern No     Comment: personal goal is in the 140's    Special Diet Yes     Comment: low cholesterol, pretty well balanced    Back Care Not Asked    Exercise Yes     Comment: 4-5 x a week: walks on treadmill x 20 minutes, antoinette rider/stretches x 200 reps a day    Bike Helmet Not Asked    Seat Belt Not Asked    Self-Exams Not Asked   Social History Narrative    Not on file     Social Determinants of Health     Financial Resource Strain: Not on file   Food Insecurity: Not on file   Transportation Needs: Not on file   Physical Activity: Not on file   Stress: Not on file   Social Connections: Not on file   Intimate Partner Violence: Not on file   Housing Stability: Not on file                ALLERGIES: Codeine and Effexor [venlafaxine]    Review of Systems Constitutional:  Negative for activity change, appetite change and fever. HENT:  Positive for congestion and postnasal drip. Respiratory:  Positive for cough. Negative for shortness of breath. Gastrointestinal:  Negative for diarrhea, nausea and vomiting. Vitals:    11/15/22 1914   BP: (!) 146/75   Pulse: 82   Resp: 18   Temp: 99.4 °F (37.4 °C)   SpO2: 98%   Weight: 156 lb (70.8 kg)       Physical Exam  Vitals and nursing note reviewed. Constitutional:       General: She is not in acute distress. Appearance: She is well-developed. She is not diaphoretic. HENT:      Right Ear: Tympanic membrane, ear canal and external ear normal.      Left Ear: Tympanic membrane, ear canal and external ear normal.      Nose: Congestion present. Right Sinus: No maxillary sinus tenderness or frontal sinus tenderness. Left Sinus: No maxillary sinus tenderness or frontal sinus tenderness. Mouth/Throat:      Pharynx: No oropharyngeal exudate or posterior oropharyngeal erythema. Tonsils: No tonsillar abscesses. Cardiovascular:      Rate and Rhythm: Normal rate and regular rhythm. Heart sounds: Normal heart sounds. Pulmonary:      Effort: Pulmonary effort is normal. No respiratory distress. Breath sounds: Normal breath sounds. No stridor. No wheezing, rhonchi or rales. Lymphadenopathy:      Cervical: No cervical adenopathy. Neurological:      Mental Status: She is alert. Psychiatric:         Behavior: Behavior normal.         Thought Content: Thought content normal.         Judgment: Judgment normal.       [facility]    ICD-10-CM ICD-9-CM   1. Viral URI  J06.9 465.9   2. Acute cough  R05.1 786.2   3. Screening for viral disease  Z11.59 V73.99       Orders Placed This Encounter    POCT COVID-19, SARS-COV-2, PCR     Order Specific Question:   Is this test for diagnosis or screening?      Answer:   Diagnosis of ill patient     Order Specific Question:   Symptomatic for COVID-19 as defined by CDC? Answer:   Yes     Order Specific Question:   Date of Symptom Onset     Answer:   11/13/2022     Order Specific Question:   Hospitalized for COVID-19? Answer:   No     Order Specific Question:   Admitted to ICU for COVID-19? Answer:   No     Order Specific Question:   Employed in healthcare setting? Answer:   No     Order Specific Question:   Resident in a congregate (group) care setting? Answer:   No     Order Specific Question:   Pregnant? Answer:   No     Order Specific Question:   Previously tested for COVID-19? Answer:   Yes      Start Paxlovid  Isolate x 5 days from symptom onset then wear well fitting mask for an additional 5 days thereafter  Deep breathing exercises, ambulation  OTC Tylenol as directed  Increase fluids with electrolytes     The patient is to follow up with PCP [facility]. If signs and symptoms become worse the pt is to go to the ER.      Results for orders placed or performed in visit on 11/15/22   POCT COVID-19, SARS-COV-2, PCR   Result Value Ref Range    SARS-COV-2 PCR, POC Positive (A) Negative         Procedures

## 2022-11-16 NOTE — PATIENT INSTRUCTIONS
If COVID positive:    Isolate x 5 days from symptom onset then wear well fitting mask for an additional 5 days thereafter  Deep breathing exercises, ambulation  OTC Tylenol as directed  Increase fluids with electrolytes

## 2023-04-21 DIAGNOSIS — Z12.31 SCREENING MAMMOGRAM FOR HIGH-RISK PATIENT: Primary | ICD-10-CM

## 2024-06-29 ENCOUNTER — OFFICE VISIT (OUTPATIENT)
Age: 69
End: 2024-06-29

## 2024-06-29 VITALS
DIASTOLIC BLOOD PRESSURE: 68 MMHG | SYSTOLIC BLOOD PRESSURE: 124 MMHG | RESPIRATION RATE: 18 BRPM | TEMPERATURE: 98.6 F | HEART RATE: 72 BPM | OXYGEN SATURATION: 98 % | HEIGHT: 62 IN | BODY MASS INDEX: 28.37 KG/M2 | WEIGHT: 154.2 LBS

## 2024-06-29 DIAGNOSIS — U07.1 COVID-19: Primary | ICD-10-CM

## 2024-06-29 DIAGNOSIS — M54.50 ACUTE BILATERAL LOW BACK PAIN WITHOUT SCIATICA: ICD-10-CM

## 2024-06-29 LAB
Lab: ABNORMAL
PERFORMING INSTRUMENT: ABNORMAL
QC PASS/FAIL: ABNORMAL
SARS-COV-2, POC: DETECTED

## 2024-06-29 RX ORDER — DICLOFENAC SODIUM 75 MG/1
75 TABLET, DELAYED RELEASE ORAL 2 TIMES DAILY PRN
Qty: 30 TABLET | Refills: 0 | Status: SHIPPED | OUTPATIENT
Start: 2024-06-29

## 2024-06-29 RX ORDER — AMOXICILLIN 500 MG/1
500 CAPSULE ORAL 3 TIMES DAILY
Qty: 30 CAPSULE | Refills: 0 | Status: SHIPPED | OUTPATIENT
Start: 2024-06-29 | End: 2024-06-29 | Stop reason: CLARIF

## 2024-06-29 ASSESSMENT — ENCOUNTER SYMPTOMS
NAUSEA: 0
BACK PAIN: 1
COUGH: 0
SINUS PAIN: 1
SHORTNESS OF BREATH: 0
SORE THROAT: 0
VOMITING: 0
DIARRHEA: 0
SINUS PRESSURE: 1

## 2024-06-29 NOTE — PROGRESS NOTES
Subjective     Chief Complaint   Patient presents with    URI     Headache, backache, sinus congestion with cough x 4 days         URI   Associated symptoms include congestion and sinus pain. Pertinent negatives include no coughing, diarrhea, nausea, sore throat or vomiting.    69-year-old female who presents for headache sinus congestion and pressure as well as a backache going on for the past 4 to 5 days.  Patient has been taking some Tylenol cold medicine for symptoms and has not helped.  No fever chills nausea vomiting no problems with urinations.  No specific trauma    Past Medical History:   Diagnosis Date    Acne 6/14/2011    Adverse effect of anesthesia 2010    \"had sz after surgery one time\"    Allergic rhinitis 6/14/2011    Anemia 6/14/2011    Cancer ([facility])     squamous cell, left ear    Cervical spondylosis 12/19/2011    COVID-19 vaccine series not completed     PFIZER 2/23/21, 3/18/21    GERD (gastroesophageal reflux disease)     Hyperchloremia     Migraines 6/14/2011    Right carpal tunnel syndrome 6/14/2011    Seizures ([facility]) 2010    patient reports, \"had sz after a surgery\" LEFT EAR SURGERY    Vaginal candidiasis 12/19/2011    Vasovagal syncope 6/14/2011    Vitamin D deficiency 6/14/2011       Past Surgical History:   Procedure Laterality Date    BIOPSY OF SKIN LESION  1/2010    SCC of left EAC    BREAST BIOPSY Right 8/2014, Dr De La Cruz    needle aspiration 2 benign cysts right breast; Fibroadenoma    COLONOSCOPY  7/2007    Normal, f/u 5 yrs; Dr Mccarthy    COLONOSCOPY  9/2012, Dr Mccarthy    Normal, f/u 5 yrs d/t FHx    COLONOSCOPY N/A 4/16/2021    COLONOSCOPY performed by Qasim De La Cruz MD at [facility] ENDOSCOPY    EKG ORDERABLE  [id number]    NSR, 1st degree AV Block, rates 60, 61, 61    EKG ORDERABLE  2008    Sinus Bradycardia, rate 57, 1st degree AV block    EMG TWO EXTREMITIES UPPER  1/2007, Dr Arreaga    mild median nerve neuropathy    [place] UGI W KUB  8/2004    normal UGI and normal CXR    HEENT Left 2010

## 2024-12-13 ENCOUNTER — OFFICE VISIT (OUTPATIENT)
Age: 69
End: 2024-12-13

## 2024-12-13 VITALS
SYSTOLIC BLOOD PRESSURE: 120 MMHG | DIASTOLIC BLOOD PRESSURE: 64 MMHG | TEMPERATURE: 97.8 F | OXYGEN SATURATION: 100 % | BODY MASS INDEX: 27.98 KG/M2 | RESPIRATION RATE: 16 BRPM | WEIGHT: 153 LBS | HEART RATE: 61 BPM

## 2024-12-13 DIAGNOSIS — J06.9 VIRAL UPPER RESPIRATORY TRACT INFECTION WITH COUGH: Primary | ICD-10-CM

## 2024-12-13 DIAGNOSIS — R05.1 ACUTE COUGH: ICD-10-CM

## 2024-12-13 RX ORDER — BENZONATATE 200 MG/1
200 CAPSULE ORAL 3 TIMES DAILY PRN
Qty: 30 CAPSULE | Refills: 0 | Status: SHIPPED | OUTPATIENT
Start: 2024-12-13 | End: 2024-12-23

## 2024-12-13 RX ORDER — METHYLPREDNISOLONE 4 MG/1
TABLET ORAL
Qty: 1 KIT | Refills: 0 | Status: SHIPPED | OUTPATIENT
Start: 2024-12-13 | End: 2024-12-19

## 2024-12-13 NOTE — PROGRESS NOTES
Patient Name: Shahida Koroma   YOB: 1955   Patient Status: New patient,   Chief Complaint: Cough (Cough and sore throat./X 1 week )      ____________________________________________________________________________________________    External Records Reviewed: None    Limitation to History: None    Outside Historian: None    SUBJECTIVE/OBJECTIVE:  Shahida Koroma is a 69 y.o. female presents with complaint of occasionally productive cough with yellow sputum, congestion and sore throat.  Symptoms began 7 day(s) ago and show no change since onset.  The patient denies fever, shortness of breath, and chest pain.  Patient is concerned about pneumonia since her symptoms have not improved as it is going around. No other acute symptoms reported at this time.          PAST MEDICAL HISTORY:   Medical: Pt  has a past medical history of Acne (6/14/2011), Adverse effect of anesthesia (2010), Allergic rhinitis (6/14/2011), Anemia (6/14/2011), Cancer ([facility]), Cervical spondylosis (12/19/2011), COVID-19 vaccine series not completed, GERD (gastroesophageal reflux disease), Hyperchloremia, Migraines (6/14/2011), Right carpal tunnel syndrome (6/14/2011), Seizures ([facility]) (2010), Vaginal candidiasis (12/19/2011), Vasovagal syncope (6/14/2011), and Vitamin D deficiency (6/14/2011).  Surgical: Pt  has a past surgical history that includes Total abdominal hysterectomy w/ bilateral salpingoophorectomy (1/97); Breast biopsy (Right, 8/2014, Dr De La Cruz); Tonsillectomy (age 17); heent (Left, 2010); Hemorrhoid surgery; repair  anal fistula,rect adv flap; Colonoscopy (7/2007); biopsy of skin lesion (1/2010); ekg orderable ([id number]); ekg orderable (2008); emg two extremities upper (1/2007, Dr Arreaga); [place] UGI W KUB (8/2004); Colonoscopy (9/2012, Dr Mccarthy); Colonoscopy (N/A, 4/16/2021); and Tubal ligation (12/96).  Family: Pt family history includes Breast Cancer (age of onset: 40) in her paternal aunt; Cancer in her mother; Colon

## 2024-12-18 ENCOUNTER — OFFICE VISIT (OUTPATIENT)
Age: 69
End: 2024-12-18

## 2024-12-18 VITALS
OXYGEN SATURATION: 99 % | DIASTOLIC BLOOD PRESSURE: 71 MMHG | TEMPERATURE: 98 F | RESPIRATION RATE: 16 BRPM | HEART RATE: 57 BPM | BODY MASS INDEX: 27.98 KG/M2 | SYSTOLIC BLOOD PRESSURE: 121 MMHG | WEIGHT: 153 LBS

## 2024-12-18 DIAGNOSIS — J32.9 SINUSITIS, UNSPECIFIED CHRONICITY, UNSPECIFIED LOCATION: Primary | ICD-10-CM

## 2024-12-18 DIAGNOSIS — R05.8 POST-VIRAL COUGH SYNDROME: ICD-10-CM

## 2024-12-18 ASSESSMENT — ENCOUNTER SYMPTOMS: COUGH: 1

## 2024-12-18 NOTE — PATIENT INSTRUCTIONS
Discussed with patient differential diagnosis includes bacterial cellulitis has symptoms have been for 2 weeks now with some improvement after finishing steroid but now continues to linger as well as some worsening symptoms the last couple days or a postviral cough syndrome which can sometimes last 3 to 4 weeks however due to congestion and runny nose more likely that this is bacterial sinusitis we will start Augmentin twice a day make sure to take medication with food to minimize GI upset including nausea vomiting loose stool or stomach pain.  Also we will go start an inhaled corticosteroid if any postviral cough syndrome going on patient can take twice a day for the next 30 days every morning and night make sure to rinse out mouth with water after use.  Also make sure to get plenty of fluids and sleep over the next week.  If symptoms do not improve or any worsening symptoms please return to PCP or urgent care depending on the severity of symptoms

## 2024-12-18 NOTE — PROGRESS NOTES
Shahida Koroma (:  1955) is a 69 y.o. female,Established patient, here for evaluation of the following chief complaint(s):  Cough (Cough and congestion. Came in on the  and no improvement. /X 2 weeks )          ASSESSMENT/PLAN:    Assessment & Plan  Sinusitis, unspecified chronicity, unspecified location            Post-viral cough syndrome          Discussed with patient differential diagnosis includes bacterial cellulitis has symptoms have been for 2 weeks now with some improvement after finishing steroid but now continues to linger as well as some worsening symptoms the last couple days or a postviral cough syndrome which can sometimes last 3 to 4 weeks however due to congestion and runny nose more likely that this is bacterial sinusitis we will start Augmentin twice a day make sure to take medication with food to minimize GI upset including nausea vomiting loose stool or stomach pain.  Also we will go start an inhaled corticosteroid if any postviral cough syndrome going on patient can take twice a day for the next 30 days every morning and night make sure to rinse out mouth with water after use.  Also make sure to get plenty of fluids and sleep over the next week.  If symptoms do not improve or any worsening symptoms please return to PCP or urgent care depending on the severity of symptoms       I have discussed the results, diagnosis and treatment plan with the patient. The patient also understands that early in the process of an illness, an urgent care workup can be falsely reassuring. Routine discharge counseling and specific return precautions discussed with patient and the patient understands that worsening, changing or persistent symptoms should prompt an immediate return to the urgent care or emergency department. Patient/Guardian expressed understanding and agrees with the discharge plan. No further questions at time of discharge.     SUBJECTIVE/OBJECTIVE:  69 y.o. female presents with

## 2025-05-20 ENCOUNTER — OFFICE VISIT (OUTPATIENT)
Age: 70
End: 2025-05-20
Payer: MEDICARE

## 2025-05-20 VITALS
WEIGHT: 155 LBS | OXYGEN SATURATION: 99 % | HEART RATE: 71 BPM | HEIGHT: 63 IN | DIASTOLIC BLOOD PRESSURE: 80 MMHG | BODY MASS INDEX: 27.46 KG/M2 | SYSTOLIC BLOOD PRESSURE: 132 MMHG

## 2025-05-20 DIAGNOSIS — G31.84 MCI (MILD COGNITIVE IMPAIRMENT): Primary | ICD-10-CM

## 2025-05-20 DIAGNOSIS — R41.3 MEMORY LOSS: Primary | ICD-10-CM

## 2025-05-20 PROCEDURE — 96132 NRPSYC TST EVAL PHYS/QHP 1ST: CPT | Performed by: PSYCHIATRY & NEUROLOGY

## 2025-05-20 PROCEDURE — 1036F TOBACCO NON-USER: CPT | Performed by: PSYCHIATRY & NEUROLOGY

## 2025-05-20 PROCEDURE — 1159F MED LIST DOCD IN RCRD: CPT | Performed by: PSYCHIATRY & NEUROLOGY

## 2025-05-20 PROCEDURE — G8400 PT W/DXA NO RESULTS DOC: HCPCS | Performed by: PSYCHIATRY & NEUROLOGY

## 2025-05-20 PROCEDURE — 99204 OFFICE O/P NEW MOD 45 MIN: CPT | Performed by: PSYCHIATRY & NEUROLOGY

## 2025-05-20 PROCEDURE — 1090F PRES/ABSN URINE INCON ASSESS: CPT | Performed by: PSYCHIATRY & NEUROLOGY

## 2025-05-20 PROCEDURE — 1123F ACP DISCUSS/DSCN MKR DOCD: CPT | Performed by: PSYCHIATRY & NEUROLOGY

## 2025-05-20 PROCEDURE — G8427 DOCREV CUR MEDS BY ELIG CLIN: HCPCS | Performed by: PSYCHIATRY & NEUROLOGY

## 2025-05-20 PROCEDURE — 3017F COLORECTAL CA SCREEN DOC REV: CPT | Performed by: PSYCHIATRY & NEUROLOGY

## 2025-05-20 PROCEDURE — G8419 CALC BMI OUT NRM PARAM NOF/U: HCPCS | Performed by: PSYCHIATRY & NEUROLOGY

## 2025-05-20 PROCEDURE — 96136 PSYCL/NRPSYC TST PHY/QHP 1ST: CPT | Performed by: PSYCHIATRY & NEUROLOGY

## 2025-05-20 PROCEDURE — 1126F AMNT PAIN NOTED NONE PRSNT: CPT | Performed by: PSYCHIATRY & NEUROLOGY

## 2025-05-20 RX ORDER — M-VIT,TX,IRON,MINS/CALC/FOLIC 27MG-0.4MG
1 TABLET ORAL DAILY
COMMUNITY

## 2025-05-20 NOTE — PROCEDURES
Recognition: Unlikely Impairment  Memory - Delayed Recognition: Unlikely Impairment    The patient's overall cognitive test performance was a standard score of 81 out of 200, which is in the 11th percentile when compared to individuals of a similar age. These results suggest the patient's presence of cognitive impairment is unlikely.

## 2025-05-20 NOTE — PROGRESS NOTES
NEUROLOGY  NEW PATIENT EVALUATION/CONSULTATION       PATIENT NAME: Shahida Koroma    MRN: [id number]    REASON FOR CONSULTATION: Memory impairment    05/20/25      Previous records (physician notes, laboratory reports, and radiology reports) and imaging studies were reviewed and summarized. My recommendations will be communicated back to the patient's physician(s) via electronic medical record and/or by US mail. The patient was accompanied by her .      HISTORY OF PRESENT ILLNESS:  Shahida Koroma is a 70 y.o.  female presenting for evaluation of memory impairment.      Onset and progression: 2 years     Neuropsychiatric symptoms    By Family members account:    Problems with judgment:No   Reduced interest in hobbies/activities: No   Repeats questions, stories, or statements: No    Trouble recalling people's names: No   Trouble learning how to use a tool or appliance: No   Forgetting the correct month or year: No   Difficulty handling financial affairs (bill-paying, taxes): No   Difficulty remembering appointments:No    Memory: Forgetful of what she is saying during conversations, occasional word finding difficulty, no disorientation.   Change in personality:None  Socially inappropriate behavior:None  Change in eating habits: None  Physical changes: occasional hand tremor on left  Depressive symptoms: None  Hallucinations/Delusions:None    Ability to function:  Driving:Yes, no issues with navigation  Finances:Handled by her   Cooking: Yes, no issues  Manages own medication: Yes, no issues   Residing: residing with her  at home     Prior work-up: TSH/B12 levels 10/16/24 normal    Prior treatments: None      PAST MEDICAL HISTORY:  Past Medical History:   Diagnosis Date    Acne 6/14/2011    Adverse effect of anesthesia 2010    \"had sz after surgery one time\"    Allergic rhinitis 6/14/2011    Anemia 6/14/2011    Cancer ([facility]CC)     squamous cell, left ear    Cervical spondylosis 12/19/2011

## 2025-05-23 ENCOUNTER — TELEPHONE (OUTPATIENT)
Age: 70
End: 2025-05-23

## 2025-06-19 ENCOUNTER — HOSPITAL ENCOUNTER (OUTPATIENT)
Facility: HOSPITAL | Age: 70
Discharge: HOME | End: 2025-06-22
Attending: PSYCHIATRY & NEUROLOGY
Payer: MEDICARE

## 2025-06-19 DIAGNOSIS — G31.84 MCI (MILD COGNITIVE IMPAIRMENT): ICD-10-CM

## 2025-06-19 PROCEDURE — 70551 MRI BRAIN STEM W/O DYE: CPT

## 2025-06-20 ENCOUNTER — RESULTS FOLLOW-UP (OUTPATIENT)
Age: 70
End: 2025-06-20

## (undated) DEVICE — TUBING, SUCTION, 1/4" X 12', STRAIGHT: Brand: MEDLINE

## (undated) DEVICE — TOWEL 4 PLY TISS 19X30 SUE WHT

## (undated) DEVICE — TRAY PREP DRY W/ PREM GLV 2 APPL 6 SPNG 2 UNDPD 1 OVERWRAP

## (undated) DEVICE — BASIN EMSIS 16OZ GRAPHITE PLAS KID SHP MOLD GRAD FOR ORAL

## (undated) DEVICE — SUTURE PERMAHAND SZ 4-0 L18IN NONABSORBABLE BLK L19MM PS-2 1677G

## (undated) DEVICE — SYR 10ML LUER LOK 1/5ML GRAD --

## (undated) DEVICE — STERILE POLYISOPRENE POWDER-FREE SURGICAL GLOVES WITH EMOLLIENT COATING: Brand: PROTEXIS

## (undated) DEVICE — NEONATAL-ADULT SPO2 SENSOR: Brand: NELLCOR

## (undated) DEVICE — DRESSING 470530 SINUSSTENT 20PK PR KNNDY: Brand: MEROCEL®

## (undated) DEVICE — SYR 3ML LL TIP 1/10ML GRAD --

## (undated) DEVICE — 1200 GUARD II KIT W/5MM TUBE W/O VAC TUBE: Brand: GUARDIAN

## (undated) DEVICE — DRESSING EAR AD 5.5IN GLSCOCK

## (undated) DEVICE — CATH IV AUTOGRD BC PNK 20GA 25 -- INSYTE

## (undated) DEVICE — SET ADMIN 16ML TBNG L100IN 2 Y INJ SITE IV PIGGY BK DISP

## (undated) DEVICE — SUTURE PLN GUT SZ 5-0 L18IN ABSRB YELLOWISH TAN L13MM PC-1 1915G

## (undated) DEVICE — 40418 TRENDELENBURG ONE-STEP ARM PROTECTORS LARGE (1 PAIR): Brand: 40418 TRENDELENBURG ONE-STEP ARM PROTECTORS LARGE (1 PAIR)

## (undated) DEVICE — SUTURE ABSORBABLE BRAIDED 4-0 P-3 18 IN UD VICRYL J494G

## (undated) DEVICE — FRAZIER SUCTION INSTRUMENT 7 FR W/CONTROL VENT & OBTURATOR: Brand: FRAZIER

## (undated) DEVICE — BLADE TYMPLSTY W2.5MM 60DEG SHRP ALL ARND BVL DN

## (undated) DEVICE — SOLIDIFIER FLD 2OZ 1500CC N DISINF IN BTL DISP SAFESORB

## (undated) DEVICE — Z DISCONTINUED PER MEDLINE LINE GAS SAMPLING O2/CO2 LNG AD 13 FT NSL W/ TBNG FILTERLINE

## (undated) DEVICE — SYR 5ML 1/5 GRAD LL NSAF LF --

## (undated) DEVICE — SUT ETHLN 5-0 18IN P3 BLK --

## (undated) DEVICE — SUTURE PERMAHAND SZ 3-0 L18IN NONABSORBABLE BLK SILK BRAID A184H

## (undated) DEVICE — NEEDLE HYPO 18GA L1.5IN PNK S STL HUB POLYPR SHLD REG BVL

## (undated) DEVICE — BIPOLAR FORCEPS CORD: Brand: VALLEYLAB

## (undated) DEVICE — Device

## (undated) DEVICE — DRAPE,EENT,SPLIT,STERILE: Brand: MEDLINE

## (undated) DEVICE — INSULATED NEEDLE ELECTRODE: Brand: EDGE

## (undated) DEVICE — DRAPE MICSCP W46XL120IN FOR ZEISS MD

## (undated) DEVICE — ELECTRODE,RADIOTRANSLUCENT,FOAM,5PK: Brand: MEDLINE

## (undated) DEVICE — #1020 STERI DRAPE 41MM X 41MM SMALL: Brand: STERI-DRAPE™

## (undated) DEVICE — BLADE OPHTH MINI BEAV SHRP --

## (undated) DEVICE — ELECTRODE 8227410 PAIRED 2 CH SET ROHS

## (undated) DEVICE — GOWN,NON-REINFORCED,XXL: Brand: MEDLINE

## (undated) DEVICE — REM POLYHESIVE ADULT PATIENT RETURN ELECTRODE: Brand: VALLEYLAB